# Patient Record
Sex: FEMALE | Race: WHITE | NOT HISPANIC OR LATINO | Employment: FULL TIME | URBAN - METROPOLITAN AREA
[De-identification: names, ages, dates, MRNs, and addresses within clinical notes are randomized per-mention and may not be internally consistent; named-entity substitution may affect disease eponyms.]

---

## 2017-12-21 ENCOUNTER — TRANSCRIBE ORDERS (OUTPATIENT)
Dept: ADMINISTRATIVE | Facility: HOSPITAL | Age: 32
End: 2017-12-21

## 2017-12-21 DIAGNOSIS — R07.89 CHEST WALL PAIN: ICD-10-CM

## 2017-12-21 DIAGNOSIS — N64.4 BREAST PAIN, LEFT: Primary | ICD-10-CM

## 2020-06-03 ENCOUNTER — HOSPITAL ENCOUNTER (EMERGENCY)
Facility: HOSPITAL | Age: 35
Discharge: HOME | End: 2020-06-03
Attending: EMERGENCY MEDICINE | Admitting: EMERGENCY MEDICINE

## 2020-06-03 ENCOUNTER — APPOINTMENT (OUTPATIENT)
Dept: ULTRASOUND IMAGING | Facility: HOSPITAL | Age: 35
End: 2020-06-03

## 2020-06-03 ENCOUNTER — APPOINTMENT (OUTPATIENT)
Dept: CT IMAGING | Facility: HOSPITAL | Age: 35
End: 2020-06-03

## 2020-06-03 VITALS
DIASTOLIC BLOOD PRESSURE: 89 MMHG | HEIGHT: 71 IN | OXYGEN SATURATION: 97 % | SYSTOLIC BLOOD PRESSURE: 128 MMHG | BODY MASS INDEX: 23.1 KG/M2 | RESPIRATION RATE: 16 BRPM | WEIGHT: 165 LBS | HEART RATE: 71 BPM | TEMPERATURE: 98.3 F

## 2020-06-03 DIAGNOSIS — R11.10 VOMITING AND DIARRHEA: ICD-10-CM

## 2020-06-03 DIAGNOSIS — R10.84 GENERALIZED ABDOMINAL PAIN: Primary | ICD-10-CM

## 2020-06-03 DIAGNOSIS — R19.7 VOMITING AND DIARRHEA: ICD-10-CM

## 2020-06-03 LAB
ALBUMIN SERPL-MCNC: 4.7 G/DL (ref 3.5–5.2)
ALBUMIN/GLOB SERPL: 1.8 G/DL
ALP SERPL-CCNC: 58 U/L (ref 39–117)
ALT SERPL W P-5'-P-CCNC: 27 U/L (ref 1–33)
ANION GAP SERPL CALCULATED.3IONS-SCNC: 15.1 MMOL/L (ref 5–15)
AST SERPL-CCNC: 48 U/L (ref 1–32)
B-HCG UR QL: NEGATIVE
BACTERIA UR QL AUTO: ABNORMAL /HPF
BASOPHILS # BLD AUTO: 0.06 10*3/MM3 (ref 0–0.2)
BASOPHILS NFR BLD AUTO: 0.7 % (ref 0–1.5)
BILIRUB SERPL-MCNC: 0.7 MG/DL (ref 0.2–1.2)
BILIRUB UR QL STRIP: ABNORMAL
BUN BLD-MCNC: 9 MG/DL (ref 6–20)
BUN/CREAT SERPL: 10.6 (ref 7–25)
CALCIUM SPEC-SCNC: 9.8 MG/DL (ref 8.6–10.5)
CHLORIDE SERPL-SCNC: 101 MMOL/L (ref 98–107)
CLARITY UR: CLEAR
CO2 SERPL-SCNC: 20.9 MMOL/L (ref 22–29)
COLOR UR: ABNORMAL
CREAT BLD-MCNC: 0.85 MG/DL (ref 0.57–1)
DEPRECATED RDW RBC AUTO: 53.8 FL (ref 37–54)
EOSINOPHIL # BLD AUTO: 0.15 10*3/MM3 (ref 0–0.4)
EOSINOPHIL NFR BLD AUTO: 1.6 % (ref 0.3–6.2)
ERYTHROCYTE [DISTWIDTH] IN BLOOD BY AUTOMATED COUNT: 14.1 % (ref 12.3–15.4)
GFR SERPL CREATININE-BSD FRML MDRD: 77 ML/MIN/1.73
GLOBULIN UR ELPH-MCNC: 2.6 GM/DL
GLUCOSE BLD-MCNC: 116 MG/DL (ref 65–99)
GLUCOSE UR STRIP-MCNC: NEGATIVE MG/DL
HCT VFR BLD AUTO: 40.5 % (ref 34–46.6)
HGB BLD-MCNC: 14.2 G/DL (ref 12–15.9)
HGB UR QL STRIP.AUTO: ABNORMAL
HYALINE CASTS UR QL AUTO: ABNORMAL /LPF
IMM GRANULOCYTES # BLD AUTO: 0.05 10*3/MM3 (ref 0–0.05)
IMM GRANULOCYTES NFR BLD AUTO: 0.5 % (ref 0–0.5)
KETONES UR QL STRIP: NEGATIVE
LEUKOCYTE ESTERASE UR QL STRIP.AUTO: NEGATIVE
LIPASE SERPL-CCNC: 43 U/L (ref 13–60)
LYMPHOCYTES # BLD AUTO: 1.56 10*3/MM3 (ref 0.7–3.1)
LYMPHOCYTES NFR BLD AUTO: 17 % (ref 19.6–45.3)
MACROCYTES BLD QL SMEAR: NORMAL
MCH RBC QN AUTO: 37.3 PG (ref 26.6–33)
MCHC RBC AUTO-ENTMCNC: 35.1 G/DL (ref 31.5–35.7)
MCV RBC AUTO: 106.3 FL (ref 79–97)
MONOCYTES # BLD AUTO: 0.39 10*3/MM3 (ref 0.1–0.9)
MONOCYTES NFR BLD AUTO: 4.2 % (ref 5–12)
MUCOUS THREADS URNS QL MICRO: ABNORMAL /HPF
NEUTROPHILS # BLD AUTO: 6.97 10*3/MM3 (ref 1.7–7)
NEUTROPHILS NFR BLD AUTO: 76 % (ref 42.7–76)
NITRITE UR QL STRIP: NEGATIVE
NRBC BLD AUTO-RTO: 0 /100 WBC (ref 0–0.2)
PH UR STRIP.AUTO: 5.5 [PH] (ref 4.5–8)
PLAT MORPH BLD: NORMAL
PLATELET # BLD AUTO: 191 10*3/MM3 (ref 140–450)
PMV BLD AUTO: 10.8 FL (ref 6–12)
POTASSIUM BLD-SCNC: 3.4 MMOL/L (ref 3.5–5.2)
PROT SERPL-MCNC: 7.3 G/DL (ref 6–8.5)
PROT UR QL STRIP: ABNORMAL
RBC # BLD AUTO: 3.81 10*6/MM3 (ref 3.77–5.28)
RBC # UR: ABNORMAL /HPF
REF LAB TEST METHOD: ABNORMAL
SODIUM BLD-SCNC: 137 MMOL/L (ref 136–145)
SP GR UR STRIP: 1.03 (ref 1–1.03)
SQUAMOUS #/AREA URNS HPF: ABNORMAL /HPF
UROBILINOGEN UR QL STRIP: ABNORMAL
WBC MORPH BLD: NORMAL
WBC NRBC COR # BLD: 9.18 10*3/MM3 (ref 3.4–10.8)
WBC UR QL AUTO: ABNORMAL /HPF

## 2020-06-03 PROCEDURE — 0 IOPAMIDOL PER 1 ML: Performed by: EMERGENCY MEDICINE

## 2020-06-03 PROCEDURE — 99283 EMERGENCY DEPT VISIT LOW MDM: CPT

## 2020-06-03 PROCEDURE — 85007 BL SMEAR W/DIFF WBC COUNT: CPT | Performed by: EMERGENCY MEDICINE

## 2020-06-03 PROCEDURE — 81025 URINE PREGNANCY TEST: CPT | Performed by: EMERGENCY MEDICINE

## 2020-06-03 PROCEDURE — 76705 ECHO EXAM OF ABDOMEN: CPT

## 2020-06-03 PROCEDURE — 83690 ASSAY OF LIPASE: CPT | Performed by: EMERGENCY MEDICINE

## 2020-06-03 PROCEDURE — 74177 CT ABD & PELVIS W/CONTRAST: CPT

## 2020-06-03 PROCEDURE — 85025 COMPLETE CBC W/AUTO DIFF WBC: CPT | Performed by: EMERGENCY MEDICINE

## 2020-06-03 PROCEDURE — 80053 COMPREHEN METABOLIC PANEL: CPT | Performed by: EMERGENCY MEDICINE

## 2020-06-03 PROCEDURE — U0002 COVID-19 LAB TEST NON-CDC: HCPCS | Performed by: EMERGENCY MEDICINE

## 2020-06-03 PROCEDURE — 99284 EMERGENCY DEPT VISIT MOD MDM: CPT | Performed by: EMERGENCY MEDICINE

## 2020-06-03 PROCEDURE — 81001 URINALYSIS AUTO W/SCOPE: CPT | Performed by: EMERGENCY MEDICINE

## 2020-06-03 RX ORDER — SODIUM CHLORIDE 0.9 % (FLUSH) 0.9 %
10 SYRINGE (ML) INJECTION AS NEEDED
Status: DISCONTINUED | OUTPATIENT
Start: 2020-06-03 | End: 2020-06-03 | Stop reason: HOSPADM

## 2020-06-03 RX ADMIN — IOPAMIDOL 50 ML: 755 INJECTION, SOLUTION INTRAVENOUS at 11:04

## 2020-06-03 NOTE — ED PROVIDER NOTES
Subjective   History of Present Illness  History of Present Illness    Chief complaint: Abdominal pain, vomiting or diarrhea    Location: Generalized abdomen but worse in the right upper quadrant area    Quality/Severity: Moderate aching, cramping pain    Timing/Duration: Ongoing for several weeks    Modifying Factors: Worse after eating, sometimes relieved when lying back flat    Narrative: This patient presents for evaluation of abdominal pain with vomiting and diarrhea symptoms for a couple of weeks now.  She has had frequent watery stools for a couple of weeks and intermittent nausea and vomiting.  The nausea vomiting is often worse after eating foods.  She says that foods of any kind can cause problems.  She is also had some pain in the right upper quadrant at times.  She went to an urgent care center this morning and they suggest that she should come here for further investigation especially of her gallbladder.  Patient is never had any abdominal surgeries.  She has not had any fevers.  She denies any dysuria or hematuria.    Associated Symptoms: As above    Review of Systems   Constitutional: Positive for appetite change. Negative for activity change and fever.   HENT: Negative.    Eyes: Negative for pain and visual disturbance.   Respiratory: Negative for cough and shortness of breath.    Cardiovascular: Negative for chest pain.   Gastrointestinal: Positive for abdominal pain, diarrhea, nausea and vomiting. Negative for constipation.   Genitourinary: Negative for dysuria, flank pain and frequency.   Skin: Negative for color change and rash.   Neurological: Negative for syncope and headaches.   All other systems reviewed and are negative.      History reviewed. No pertinent past medical history.    No Known Allergies    Past Surgical History:   Procedure Laterality Date   • INDUCED          History reviewed. No pertinent family history.    Social History     Socioeconomic History   • Marital  status:      Spouse name: Not on file   • Number of children: Not on file   • Years of education: Not on file   • Highest education level: Not on file   Tobacco Use   • Smoking status: Current Every Day Smoker   • Smokeless tobacco: Never Used   Substance and Sexual Activity   • Alcohol use: Yes     Alcohol/week: 15.0 standard drinks     Types: 15 Cans of beer per week   • Drug use: Defer   • Sexual activity: Defer     ED Triage Vitals [06/03/20 0921]   Temp Heart Rate Resp BP SpO2   97.6 °F (36.4 °C) 91 16 (!) 144/107 99 %      Temp src Heart Rate Source Patient Position BP Location FiO2 (%)   Tympanic Monitor Sitting Left arm --     Objective   Physical Exam   Constitutional: She is oriented to person, place, and time. She appears well-developed and well-nourished.  Non-toxic appearance. She does not appear ill. No distress.   HENT:   Head: Normocephalic and atraumatic.   Eyes: Pupils are equal, round, and reactive to light. EOM are normal. Right eye exhibits no discharge. Left eye exhibits no discharge.   Neck: Normal range of motion. Neck supple.   Cardiovascular: Normal rate, regular rhythm, normal heart sounds and intact distal pulses.   No murmur heard.  Pulmonary/Chest: Effort normal and breath sounds normal. No stridor. No respiratory distress. She has no wheezes. She has no rhonchi. She has no rales. She exhibits no tenderness.   Abdominal: Normal appearance and bowel sounds are normal. She exhibits no fluid wave. There is tenderness in the right upper quadrant and epigastric area. There is no rigidity, no rebound and no guarding. No hernia.   Musculoskeletal: Normal range of motion. She exhibits no edema or deformity.   Neurological: She is alert and oriented to person, place, and time.   Skin: Skin is warm and dry. No rash noted. No erythema. No pallor.   Psychiatric: She has a normal mood and affect. Her behavior is normal. Judgment and thought content normal.   Nursing note and vitals  reviewed.    Results for orders placed or performed during the hospital encounter of 06/03/20   Comprehensive Metabolic Panel   Result Value Ref Range    Glucose 116 (H) 65 - 99 mg/dL    BUN 9 6 - 20 mg/dL    Creatinine 0.85 0.57 - 1.00 mg/dL    Sodium 137 136 - 145 mmol/L    Potassium 3.4 (L) 3.5 - 5.2 mmol/L    Chloride 101 98 - 107 mmol/L    CO2 20.9 (L) 22.0 - 29.0 mmol/L    Calcium 9.8 8.6 - 10.5 mg/dL    Total Protein 7.3 6.0 - 8.5 g/dL    Albumin 4.70 3.50 - 5.20 g/dL    ALT (SGPT) 27 1 - 33 U/L    AST (SGOT) 48 (H) 1 - 32 U/L    Alkaline Phosphatase 58 39 - 117 U/L    Total Bilirubin 0.7 0.2 - 1.2 mg/dL    eGFR Non African Amer 77 >60 mL/min/1.73    Globulin 2.6 gm/dL    A/G Ratio 1.8 g/dL    BUN/Creatinine Ratio 10.6 7.0 - 25.0    Anion Gap 15.1 (H) 5.0 - 15.0 mmol/L   Lipase   Result Value Ref Range    Lipase 43 13 - 60 U/L   Pregnancy, Urine - Urine, Clean Catch   Result Value Ref Range    HCG, Urine QL Negative Negative   Urinalysis With Microscopic If Indicated (No Culture) - Urine, Clean Catch   Result Value Ref Range    Color, UA Dark Yellow (A) Yellow, Straw    Appearance, UA Clear Clear    pH, UA 5.5 4.5 - 8.0    Specific Gravity, UA 1.032 (H) 1.003 - 1.030    Glucose, UA Negative Negative    Ketones, UA Negative Negative    Bilirubin, UA Small (1+) (A) Negative    Blood, UA Small (1+) (A) Negative    Protein, UA 30 mg/dL (1+) (A) Negative    Leuk Esterase, UA Negative Negative    Nitrite, UA Negative Negative    Urobilinogen, UA 0.2 E.U./dL 0.2 - 1.0 E.U./dL   CBC Auto Differential   Result Value Ref Range    WBC 9.18 3.40 - 10.80 10*3/mm3    RBC 3.81 3.77 - 5.28 10*6/mm3    Hemoglobin 14.2 12.0 - 15.9 g/dL    Hematocrit 40.5 34.0 - 46.6 %    .3 (H) 79.0 - 97.0 fL    MCH 37.3 (H) 26.6 - 33.0 pg    MCHC 35.1 31.5 - 35.7 g/dL    RDW 14.1 12.3 - 15.4 %    RDW-SD 53.8 37.0 - 54.0 fl    MPV 10.8 6.0 - 12.0 fL    Platelets 191 140 - 450 10*3/mm3    Neutrophil % 76.0 42.7 - 76.0 %    Lymphocyte %  17.0 (L) 19.6 - 45.3 %    Monocyte % 4.2 (L) 5.0 - 12.0 %    Eosinophil % 1.6 0.3 - 6.2 %    Basophil % 0.7 0.0 - 1.5 %    Immature Grans % 0.5 0.0 - 0.5 %    Neutrophils, Absolute 6.97 1.70 - 7.00 10*3/mm3    Lymphocytes, Absolute 1.56 0.70 - 3.10 10*3/mm3    Monocytes, Absolute 0.39 0.10 - 0.90 10*3/mm3    Eosinophils, Absolute 0.15 0.00 - 0.40 10*3/mm3    Basophils, Absolute 0.06 0.00 - 0.20 10*3/mm3    Immature Grans, Absolute 0.05 0.00 - 0.05 10*3/mm3    nRBC 0.0 0.0 - 0.2 /100 WBC   Scan Slide   Result Value Ref Range    Macrocytes Slight/1+ None Seen    WBC Morphology Normal Normal    Platelet Morphology Normal Normal   Urinalysis, Microscopic Only - Urine, Clean Catch   Result Value Ref Range    RBC, UA 3-5 (A) None Seen /HPF    WBC, UA None Seen None Seen /HPF    Bacteria, UA None Seen None Seen /HPF    Squamous Epithelial Cells, UA 3-6 (A) None Seen, 0-2 /HPF    Hyaline Casts, UA None Seen None Seen /LPF    Mucus, UA Small/1+ (A) None Seen, Trace /HPF    Methodology Manual Light Microscopy      RADIOLOGY        Study: Ultrasound gallbladder    Findings: Increased echogenicity within the liver suggesting fatty change.  Otherwise normal    Interpreted contemporaneously with treatment by Dr. Pereira, independently viewed by me    RADIOLOGY        Study: CT abdomen pelvis with contrast    Findings: 17 mm right ovarian cyst which should be physiologic in a  patient this age.    Otherwise normal study      Interpreted contemporaneously with treatment by Dr. Pereira, independently viewed by me    Procedures           ED Course  ED Course as of Jun 03 1212   Wed Jun 03, 2020   1211 I have reviewed the labs and radiology reports from today's visit.  Overall there are no worrisome findings here.  Patient's abdominal exam is not worrisome as there are no peritoneal features at all.  I suggested that she should follow-up in the gastroenterology clinic for further outpatient testing.  I explained that it may be beneficial to  "have endoscopies or other modalities looked into for her symptoms.  I gave her the phone number for Dr. Akbar's office and recommended she call them today.  I reviewed with her the usual \"return to ER\" instructions for worsening signs or symptoms.  She was discharged home in good condition after that.    [CHRIST]      ED Course User Index  [CHRIST] Toni Keith MD                                           MDM  Number of Diagnoses or Management Options  Generalized abdominal pain:   Vomiting and diarrhea:      Amount and/or Complexity of Data Reviewed  Clinical lab tests: reviewed and ordered  Tests in the radiology section of CPT®: reviewed and ordered  Decide to obtain previous medical records or to obtain history from someone other than the patient: yes  Review and summarize past medical records: yes  Independent visualization of images, tracings, or specimens: yes    Risk of Complications, Morbidity, and/or Mortality  Presenting problems: moderate  Diagnostic procedures: moderate  Management options: moderate        Final diagnoses:   Generalized abdominal pain   Vomiting and diarrhea            Toni Keith MD  06/03/20 1212    "

## 2020-06-03 NOTE — ED TRIAGE NOTES
Pt ambulatory with steady  Gait to room 5. Reports RUQ abdominal pain x days with associated nausea and diarrhea. Pt reports she went to [facility] urgent care this morning and was told to come to ED to have gallbladder US. Tenderness located to RUQ upon palpation. No radiation reported. Pt states sx worsened after eating and relieved with heat pack and laying flat.

## 2020-06-03 NOTE — DISCHARGE INSTRUCTIONS
Recommend outpatient follow-up with Dr. Akbar from the GI specialty office.  Please return to the emergency room for any worsening pain, fevers, vomiting, diarrhea, weakness, bleeding, difficulties urinating or any other concerns.

## 2020-06-11 ENCOUNTER — OFFICE VISIT (OUTPATIENT)
Dept: GASTROENTEROLOGY | Facility: CLINIC | Age: 35
End: 2020-06-11

## 2020-06-11 ENCOUNTER — LAB (OUTPATIENT)
Dept: LAB | Facility: HOSPITAL | Age: 35
End: 2020-06-11

## 2020-06-11 VITALS — TEMPERATURE: 98.2 F | WEIGHT: 171 LBS | BODY MASS INDEX: 23.94 KG/M2 | HEIGHT: 71 IN

## 2020-06-11 DIAGNOSIS — Z83.71 FAMILY HISTORY OF POLYPS IN THE COLON: ICD-10-CM

## 2020-06-11 DIAGNOSIS — K21.9 GASTROESOPHAGEAL REFLUX DISEASE, ESOPHAGITIS PRESENCE NOT SPECIFIED: ICD-10-CM

## 2020-06-11 DIAGNOSIS — R19.7 DIARRHEA OF PRESUMED INFECTIOUS ORIGIN: ICD-10-CM

## 2020-06-11 DIAGNOSIS — R19.7 DIARRHEA OF PRESUMED INFECTIOUS ORIGIN: Primary | ICD-10-CM

## 2020-06-11 PROCEDURE — 99203 OFFICE O/P NEW LOW 30 MIN: CPT | Performed by: INTERNAL MEDICINE

## 2020-06-11 PROCEDURE — 36415 COLL VENOUS BLD VENIPUNCTURE: CPT

## 2020-06-11 PROCEDURE — 86677 HELICOBACTER PYLORI ANTIBODY: CPT

## 2020-06-11 RX ORDER — PANTOPRAZOLE SODIUM 40 MG/1
40 TABLET, DELAYED RELEASE ORAL DAILY
Qty: 90 TABLET | Refills: 3 | Status: SHIPPED | OUTPATIENT
Start: 2020-06-11 | End: 2020-08-04

## 2020-06-11 NOTE — PROGRESS NOTES
"    PATIENT INFORMATION  Elin Quiñonez       - 1985    CHIEF COMPLAINT  Chief Complaint   Patient presents with   • Diarrhea   • Abdominal Pain       HISTORY OF PRESENT ILLNESS  Here from ER f/u for abd pain and diarrhea    Normal 1-2 a day and 4 weeks of nocturnal and daily BMs of bilious watery and 8-10 a day    Post prandial cramping and nausea and only 2 episodes of vomiting    Was treated for Diverticulitis but review of [facility] records was c/w Epiploic appedagitis    Does have significant belching and mild dysphagia but no meat impactions and does get sour water brash. Has use TUMs and Pepcid  Does take a Prenatal vitamen daily    Does take ibuprofen weekly for years          REVIEW OF SYSTEMS  Review of Systems   Constitutional: Positive for appetite change.   Gastrointestinal: Positive for abdominal pain and diarrhea.         ACTIVE PROBLEMS  There are no active problems to display for this patient.        PAST MEDICAL HISTORY  History reviewed. No pertinent past medical history.      SURGICAL HISTORY  Past Surgical History:   Procedure Laterality Date   • INDUCED            FAMILY HISTORY  Family History   Problem Relation Age of Onset   • Colon polyps Mother    • Colon cancer Neg Hx          SOCIAL HISTORY  Social History     Occupational History   • Not on file   Tobacco Use   • Smoking status: Current Every Day Smoker   • Smokeless tobacco: Never Used   Substance and Sexual Activity   • Alcohol use: Yes     Alcohol/week: 15.0 standard drinks     Types: 15 Cans of beer per week   • Drug use: Defer   • Sexual activity: Defer         CURRENT MEDICATIONS    Current Outpatient Medications:   •  pantoprazole (PROTONIX) 40 MG EC tablet, Take 1 tablet by mouth Daily., Disp: 90 tablet, Rfl: 3    ALLERGIES  Patient has no known allergies.    VITALS  Vitals:    20 1348   Temp: 98.2 °F (36.8 °C)   TempSrc: Temporal   Weight: 77.6 kg (171 lb)   Height: 180.3 cm (70.98\")       LAST " RESULTS   Admission on 06/03/2020, Discharged on 06/03/2020   Component Date Value Ref Range Status   • Glucose 06/03/2020 116* 65 - 99 mg/dL Final   • BUN 06/03/2020 9  6 - 20 mg/dL Final   • Creatinine 06/03/2020 0.85  0.57 - 1.00 mg/dL Final   • Sodium 06/03/2020 137  136 - 145 mmol/L Final   • Potassium 06/03/2020 3.4* 3.5 - 5.2 mmol/L Final   • Chloride 06/03/2020 101  98 - 107 mmol/L Final   • CO2 06/03/2020 20.9* 22.0 - 29.0 mmol/L Final   • Calcium 06/03/2020 9.8  8.6 - 10.5 mg/dL Final   • Total Protein 06/03/2020 7.3  6.0 - 8.5 g/dL Final   • Albumin 06/03/2020 4.70  3.50 - 5.20 g/dL Final   • ALT (SGPT) 06/03/2020 27  1 - 33 U/L Final   • AST (SGOT) 06/03/2020 48* 1 - 32 U/L Final   • Alkaline Phosphatase 06/03/2020 58  39 - 117 U/L Final   • Total Bilirubin 06/03/2020 0.7  0.2 - 1.2 mg/dL Final   • eGFR Non African Amer 06/03/2020 77  >60 mL/min/1.73 Final   • Globulin 06/03/2020 2.6  gm/dL Final   • A/G Ratio 06/03/2020 1.8  g/dL Final   • BUN/Creatinine Ratio 06/03/2020 10.6  7.0 - 25.0 Final   • Anion Gap 06/03/2020 15.1* 5.0 - 15.0 mmol/L Final   • Lipase 06/03/2020 43  13 - 60 U/L Final   • HCG, Urine QL 06/03/2020 Negative  Negative Final   • Color, UA 06/03/2020 Dark Yellow* Yellow, Straw Final   • Appearance, UA 06/03/2020 Clear  Clear Final   • pH, UA 06/03/2020 5.5  4.5 - 8.0 Final   • Specific Gravity, UA 06/03/2020 1.032* 1.003 - 1.030 Final    Result obtained by Refractometer   • Glucose, UA 06/03/2020 Negative  Negative Final   • Ketones, UA 06/03/2020 Negative  Negative Final   • Bilirubin, UA 06/03/2020 Small (1+)* Negative Final   • Blood, UA 06/03/2020 Small (1+)* Negative Final   • Protein, UA 06/03/2020 30 mg/dL (1+)* Negative Final   • Leuk Esterase, UA 06/03/2020 Negative  Negative Final   • Nitrite, UA 06/03/2020 Negative  Negative Final   • Urobilinogen, UA 06/03/2020 0.2 E.U./dL  0.2 - 1.0 E.U./dL Final   • WBC 06/03/2020 9.18  3.40 - 10.80 10*3/mm3 Final   • RBC 06/03/2020 3.81   3.77 - 5.28 10*6/mm3 Final   • Hemoglobin 06/03/2020 14.2  12.0 - 15.9 g/dL Final   • Hematocrit 06/03/2020 40.5  34.0 - 46.6 % Final   • MCV 06/03/2020 106.3* 79.0 - 97.0 fL Final   • MCH 06/03/2020 37.3* 26.6 - 33.0 pg Final   • MCHC 06/03/2020 35.1  31.5 - 35.7 g/dL Final   • RDW 06/03/2020 14.1  12.3 - 15.4 % Final   • RDW-SD 06/03/2020 53.8  37.0 - 54.0 fl Final   • MPV 06/03/2020 10.8  6.0 - 12.0 fL Final   • Platelets 06/03/2020 191  140 - 450 10*3/mm3 Final   • Neutrophil % 06/03/2020 76.0  42.7 - 76.0 % Final   • Lymphocyte % 06/03/2020 17.0* 19.6 - 45.3 % Final   • Monocyte % 06/03/2020 4.2* 5.0 - 12.0 % Final   • Eosinophil % 06/03/2020 1.6  0.3 - 6.2 % Final   • Basophil % 06/03/2020 0.7  0.0 - 1.5 % Final   • Immature Grans % 06/03/2020 0.5  0.0 - 0.5 % Final   • Neutrophils, Absolute 06/03/2020 6.97  1.70 - 7.00 10*3/mm3 Final   • Lymphocytes, Absolute 06/03/2020 1.56  0.70 - 3.10 10*3/mm3 Final   • Monocytes, Absolute 06/03/2020 0.39  0.10 - 0.90 10*3/mm3 Final   • Eosinophils, Absolute 06/03/2020 0.15  0.00 - 0.40 10*3/mm3 Final   • Basophils, Absolute 06/03/2020 0.06  0.00 - 0.20 10*3/mm3 Final   • Immature Grans, Absolute 06/03/2020 0.05  0.00 - 0.05 10*3/mm3 Final   • nRBC 06/03/2020 0.0  0.0 - 0.2 /100 WBC Final   • Macrocytes 06/03/2020 Slight/1+  None Seen Final   • WBC Morphology 06/03/2020 Normal  Normal Final   • Platelet Morphology 06/03/2020 Normal  Normal Final   • RBC, UA 06/03/2020 3-5* None Seen /HPF Final   • WBC, UA 06/03/2020 None Seen  None Seen /HPF Final   • Bacteria, UA 06/03/2020 None Seen  None Seen /HPF Final   • Squamous Epithelial Cells, UA 06/03/2020 3-6* None Seen, 0-2 /HPF Final   • Hyaline Casts, UA 06/03/2020 None Seen  None Seen /LPF Final   • Mucus, UA 06/03/2020 Small/1+* None Seen, Trace /HPF Final   • Methodology 06/03/2020 Manual Light Microscopy   Final     Ct Abdomen Pelvis With Contrast    Result Date: 6/3/2020  Narrative: CT abdomen and pelvis with contrast    06/03/2020  HISTORY: Right-sided abdominal pain for 2 weeks  COMPARISON: NONE  TECHNIQUE:  CT of Abdomen and Pelvis with contrast performed.  Sagittal and Coronal reconstructions performed. Radiation dose reduction techniques included automated exposure control or exposure modulation based on body size. Radiation audit for CT and nuclear cardiology exams in the last 12 months: 0.  FINDINGS:  Abdomen: Lung bases are clear. Liver, gallbladder, spleen, pancreas, adrenal glands and kidneys are normal in appearance. Aorta is normal in size. There is no adenopathy. Bowel is normal. Terminal ileum cecum appear normal. The appendix is normal.   Pelvis: Bladder uterus and left ovary appear normal. The right ovary has a small cyst measuring 17 mm in diameter. Bones are unremarkable.       Impression: 17 mm right ovarian cyst which should be physiologic in a patient this age.  Otherwise normal study  This report was finalized on 6/3/2020 11:29 AM by Dr. Madhav Pereira MD.      Us Gallbladder    Result Date: 6/3/2020  Narrative: ULTRASOUND ABDOMEN, LIMITED, 06/03/2020  HISTORY: Intermittent nausea vomiting diarrhea for 2 weeks with right upper quadrant pain  TECHNIQUE: Grayscale ultrasound imaging of the right upper quadrant was performed.  FINDINGS: The visualized portions of the pancreas are normal. Liver slightly increased in echogenicity suggesting fatty change The gallbladder is normal in ultrasound appearance. There is no visible cholelithiasis, gallbladder wall thickening or adjacent fluid collection. There is no intrahepatic or extra hepatic bile duct dilatation (CBD 3 mm). Right kidney is negative with no hydronephrosis.      Impression: Increased echogenicity within the liver suggesting fatty change. Otherwise normal  This report was finalized on 6/3/2020 10:31 AM by Dr. Madhav Pereira MD.        PHYSICAL EXAM  Debilities/Disabilities Identified: None  Emotional Behavior: Appropriate  Physical Exam   Constitutional: She is  oriented to person, place, and time. She appears well-developed and well-nourished.   HENT:   Head: Normocephalic and atraumatic.   Eyes: Pupils are equal, round, and reactive to light. Conjunctivae and EOM are normal. No scleral icterus.   Neck: Normal range of motion. Neck supple. No thyromegaly present.   Cardiovascular: Normal rate, regular rhythm, normal heart sounds and intact distal pulses. Exam reveals no gallop.   No murmur heard.  Pulmonary/Chest: Effort normal and breath sounds normal. She has no wheezes. She has no rales.   Abdominal: Soft. Bowel sounds are normal. She exhibits no shifting dullness, no distension, no fluid wave, no abdominal bruit, no ascites and no mass. There is no hepatosplenomegaly. There is tenderness in the right upper quadrant, epigastric area and left upper quadrant. There is no guarding and negative Encarnacion's sign. Hernia confirmed negative in the ventral area.   Musculoskeletal: Normal range of motion. She exhibits no edema.   Lymphadenopathy:     She has no cervical adenopathy.   Neurological: She is alert and oriented to person, place, and time.   Skin: Skin is warm and dry. No rash noted. She is not diaphoretic. No erythema.   Psychiatric: She has a normal mood and affect. Her behavior is normal.       CLINICAL DATA REVIEWED   reviewed previous lab results and integrated with today's visit, reviewed notes from other physicians and/or last GI encounter, reviewed previous endoscopy results and available photos    ASSESSMENT  Diagnoses and all orders for this visit:    Diarrhea of presumed infectious origin  -     Helicobacter Pylori, IgA IgG IgM; Future  -     Clostridium Difficile Toxin, PCR - Stool, Per Rectum; Future  -     Gastrointestinal Panel, PCR - Stool, Per Rectum; Future    Family history of polyps in the colon    Gastroesophageal reflux disease, esophagitis presence not specified    Other orders  -     pantoprazole (PROTONIX) 40 MG EC tablet; Take 1 tablet by mouth  Daily.          PLAN  Check SS, HP and start a PPI will proceed with results as they come back  Return in about 2 months (around 8/11/2020), or if symptoms worsen or fail to improve.    I have discussed the above plan with the patient.  They verbalize understanding and are in agreement with the plan.  They have been advised to contact the office for any questions, concerns, or changes related to their health.

## 2020-06-12 ENCOUNTER — LAB (OUTPATIENT)
Dept: LAB | Facility: HOSPITAL | Age: 35
End: 2020-06-12

## 2020-06-12 DIAGNOSIS — R19.7 DIARRHEA OF PRESUMED INFECTIOUS ORIGIN: ICD-10-CM

## 2020-06-12 LAB
ADV 40+41 DNA STL QL NAA+NON-PROBE: NOT DETECTED
ASTRO TYP 1-8 RNA STL QL NAA+NON-PROBE: NOT DETECTED
C CAYETANENSIS DNA STL QL NAA+NON-PROBE: NOT DETECTED
C DIFF GDH STL QL: NEGATIVE
CAMPY SP DNA.DIARRHEA STL QL NAA+PROBE: NOT DETECTED
CRYPTOSP STL CULT: NOT DETECTED
E COLI DNA SPEC QL NAA+PROBE: NOT DETECTED
E HISTOLYT AG STL-ACNC: NOT DETECTED
EAEC PAA PLAS AGGR+AATA ST NAA+NON-PRB: NOT DETECTED
EC STX1 + STX2 GENES STL NAA+PROBE: NOT DETECTED
EPEC EAE GENE STL QL NAA+NON-PROBE: NOT DETECTED
ETEC LTA+ST1A+ST1B TOX ST NAA+NON-PROBE: NOT DETECTED
G LAMBLIA DNA SPEC QL NAA+PROBE: NOT DETECTED
H PYLORI IGA SER IA-ACNC: <9 UNITS (ref 0–8.9)
H PYLORI IGG SER IA-ACNC: 0.57 INDEX VALUE (ref 0–0.79)
H PYLORI IGM SER-ACNC: <9 UNITS (ref 0–8.9)
NOROVIRUS GI+II RNA STL QL NAA+NON-PROBE: NOT DETECTED
P SHIGELLOIDES DNA STL QL NAA+PROBE: NOT DETECTED
RV RNA STL NAA+PROBE: NOT DETECTED
SALMONELLA DNA SPEC QL NAA+PROBE: NOT DETECTED
SAPO I+II+IV+V RNA STL QL NAA+NON-PROBE: NOT DETECTED
SHIGELLA SP+EIEC IPAH STL QL NAA+PROBE: NOT DETECTED
V CHOLERAE DNA SPEC QL NAA+PROBE: NOT DETECTED
VIBRIO DNA SPEC NAA+PROBE: NOT DETECTED
YERSINIA STL CULT: NOT DETECTED

## 2020-06-12 PROCEDURE — 87449 NOS EACH ORGANISM AG IA: CPT

## 2020-06-12 PROCEDURE — 87324 CLOSTRIDIUM AG IA: CPT

## 2020-06-12 PROCEDURE — 0097U HC BIOFIRE FILMARRAY GI PANEL: CPT

## 2020-07-17 ENCOUNTER — TELEPHONE (OUTPATIENT)
Dept: GASTROENTEROLOGY | Facility: CLINIC | Age: 35
End: 2020-07-17

## 2020-07-20 ENCOUNTER — PREP FOR SURGERY (OUTPATIENT)
Dept: OTHER | Facility: HOSPITAL | Age: 35
End: 2020-07-20

## 2020-07-20 DIAGNOSIS — R19.7 VOMITING AND DIARRHEA: Primary | ICD-10-CM

## 2020-07-20 DIAGNOSIS — R11.10 VOMITING AND DIARRHEA: Primary | ICD-10-CM

## 2020-07-20 DIAGNOSIS — K21.9 GASTROESOPHAGEAL REFLUX DISEASE, ESOPHAGITIS PRESENCE NOT SPECIFIED: Primary | ICD-10-CM

## 2020-07-20 DIAGNOSIS — K21.9 GERD WITHOUT ESOPHAGITIS: ICD-10-CM

## 2020-07-20 DIAGNOSIS — R19.7 DIARRHEA: ICD-10-CM

## 2020-07-20 NOTE — TELEPHONE ENCOUNTER
CALLED AND SPOKE WITH PATIENT.  EXPLAINED NEED TO SCHEDULE EGD & COLONOSCOPY.  SCHEDULED AT [place] 08/07/2020 AT 1:30PM - ARRIVE 12:30PM.  WILL MAIL INSTRUCTIONS.

## 2020-07-20 NOTE — TELEPHONE ENCOUNTER
Dr. Akbar recommended EGD/colonoscopy if not improving, so I will go ahead and order these for her.  Since her  GI studies labs were all negative and protonix 40mg po  Daily is not improving symptoms of GI upset and diarrhea.

## 2020-07-29 ENCOUNTER — TELEPHONE (OUTPATIENT)
Dept: GASTROENTEROLOGY | Facility: CLINIC | Age: 35
End: 2020-07-29

## 2020-07-29 NOTE — TELEPHONE ENCOUNTER
RECEIVED EMAILED:    Do you want me to cancel or reschedule?    From: Greta Patel ([facility]) <[email or web address]   Sent: 2020 2:19 PM  To: Patria Issa ([facility]) <[email or web address]>; Koby Ordonez ([facility]) <[email or web address]>  Subject: cancel    Hi, I spoke with Elin littlejohn 1985 id#[id number] scheduled for egd & colonoscopy on 2020. She is rescheduling due to quarantine for poss CORONA virus, she will call ’s office. ANITRA    Thank you J have a great night!!!!  Greta MILES

## 2020-08-04 ENCOUNTER — OFFICE VISIT (OUTPATIENT)
Dept: INTERNAL MEDICINE | Facility: CLINIC | Age: 35
End: 2020-08-04

## 2020-08-04 VITALS
HEIGHT: 71 IN | RESPIRATION RATE: 14 BRPM | BODY MASS INDEX: 22.2 KG/M2 | WEIGHT: 158.6 LBS | HEART RATE: 93 BPM | TEMPERATURE: 97.3 F | SYSTOLIC BLOOD PRESSURE: 110 MMHG | OXYGEN SATURATION: 98 % | DIASTOLIC BLOOD PRESSURE: 88 MMHG

## 2020-08-04 DIAGNOSIS — Z72.0 TOBACCO ABUSE DISORDER: Primary | ICD-10-CM

## 2020-08-04 DIAGNOSIS — Z00.00 ROUTINE HEALTH MAINTENANCE: ICD-10-CM

## 2020-08-04 DIAGNOSIS — R19.7 DIARRHEA, UNSPECIFIED TYPE: ICD-10-CM

## 2020-08-04 DIAGNOSIS — K21.9 GASTROESOPHAGEAL REFLUX DISEASE, ESOPHAGITIS PRESENCE NOT SPECIFIED: ICD-10-CM

## 2020-08-04 DIAGNOSIS — Z23 NEED FOR TDAP VACCINATION: ICD-10-CM

## 2020-08-04 PROCEDURE — 90471 IMMUNIZATION ADMIN: CPT | Performed by: NURSE PRACTITIONER

## 2020-08-04 PROCEDURE — 99214 OFFICE O/P EST MOD 30 MIN: CPT | Performed by: NURSE PRACTITIONER

## 2020-08-04 PROCEDURE — 90715 TDAP VACCINE 7 YRS/> IM: CPT | Performed by: NURSE PRACTITIONER

## 2020-08-04 NOTE — PROGRESS NOTES
Subjective    Elin Quiñonez is a 35 y.o. female presenting today for   Chief Complaint   Patient presents with   • Establish Care   • GI Problem   • Diarrhea   • Nicotine Dependence       History of Present Illness     Elin Quiñonez presents today as a new patient to me to establish care.   Prior PCP was no one for many years.  Patient Care Team:  Laura Barclay APRN as PCP - General (Family Medicine)  Naomie, Karan Gregory MD as Consulting Physician (Gastroenterology)    Current/chronic health conditions include:    There is no problem list on file for this patient.      No current outpatient medications on file.        New complaints today include:    Pt reports stress/anxiety and depression r/t pandemic. She gained 20# initially during quarantine and then has now lost that 20# through diet and exercise.    Pt has been having some issues w/ reflux as well as diarrhea. These had been going on for months. She has been evaluated by GI - Dr. Akbar. She is scheduled for EGD and colonoscopy. These are improved since she is smoking less and has cut out gluten.    Pt is a 1.5 PPD smoker since age 19. She has been ill w/ URI over the last two weeks. She has had two negative Covid tests. She is currently on abx therapy prescribed by [facility]. She is working on smoking cessation.        Social:  Pt is a  worker.        The following portions of the patient's history were reviewed and updated as appropriate: allergies, current medications, problem list, past medical history, past surgical history, family history, and social history.     Review of Systems   Constitutional: Negative.    HENT: Positive for ear pain.    Eyes: Negative.    Respiratory: Positive for cough. Negative for chest tightness, shortness of breath and wheezing.    Cardiovascular: Negative.    Gastrointestinal: Positive for diarrhea and GERD.   Endocrine: Negative.    Genitourinary: Negative.    Musculoskeletal: Negative.    Skin:  "Negative.    Neurological: Negative.    Hematological: Negative.    Psychiatric/Behavioral: Negative.        Objective    Vitals:    08/04/20 1014   BP: 110/88   BP Location: Left arm   Patient Position: Sitting   Cuff Size: Adult   Pulse: 93   Resp: 14   Temp: 97.3 °F (36.3 °C)   TempSrc: Temporal   SpO2: 98%   Weight: 71.9 kg (158 lb 9.6 oz)   Height: 180.3 cm (70.98\")     Body mass index is 22.13 kg/m².  Nursing notes and vitals reviewed.    Physical Exam   Constitutional: She is oriented to person, place, and time. She appears well-developed and well-nourished.   HENT:   Head: Normocephalic.   Right Ear: Hearing, tympanic membrane, external ear and ear canal normal.   Left Ear: Hearing, tympanic membrane, external ear and ear canal normal.   Nose: Nose normal. No mucosal edema or rhinorrhea.   Mouth/Throat: Uvula is midline, oropharynx is clear and moist and mucous membranes are normal. No tonsillar exudate.   Eyes: Pupils are equal, round, and reactive to light. Conjunctivae, EOM and lids are normal.   Neck: Normal range of motion. Neck supple. No thyroid mass and no thyromegaly present.   Cardiovascular: Regular rhythm, S1 normal, S2 normal and normal pulses. Exam reveals no gallop and no friction rub.   No murmur heard.  Pulmonary/Chest: Effort normal and breath sounds normal. She has no wheezes. She has no rhonchi. She has no rales.   Abdominal: Soft. Normal appearance and bowel sounds are normal. There is no hepatosplenomegaly. There is no tenderness. There is no guarding. No hernia.   Musculoskeletal: Normal range of motion. She exhibits no edema or deformity.   Lymphadenopathy:     She has no cervical adenopathy.   Neurological: She is alert and oriented to person, place, and time. She has normal strength and normal reflexes. No cranial nerve deficit or sensory deficit.   Skin: Skin is warm, dry and intact. No lesion and no rash noted.   Psychiatric: She has a normal mood and affect. Her speech is normal " and behavior is normal. Thought content normal. She is attentive.       No results found for this or any previous visit (from the past 672 hour(s)).      Assessment and Plan    Elin was seen today for establish care, gi problem, diarrhea and nicotine dependence.    Diagnoses and all orders for this visit:    Tobacco abuse disorder    Gastroesophageal reflux disease, esophagitis presence not specified    Diarrhea, unspecified type    Routine health maintenance  -     CBC & Differential  -     Comprehensive Metabolic Panel  -     Hepatitis C Antibody  -     Lipid Panel With / Chol / HDL Ratio  -     Thyroid Cascade Profile  -     Vitamin D 25 Hydroxy    Need for Tdap vaccination  -     Tdap Vaccine Greater Than or Equal To 8yo IM        Fasting. Labs today.  Counseled re smoking cessation.  Provided contact info for OB/GYN for well-woman care.        The plan of care was discussed. All questions were answered. Patient verbalized understanding.        Return in about 1 week (around 8/11/2020) for Annual physical.

## 2020-08-05 LAB
25(OH)D3+25(OH)D2 SERPL-MCNC: 57.4 NG/ML (ref 30–100)
ALBUMIN SERPL-MCNC: 5 G/DL (ref 3.5–5.2)
ALBUMIN/GLOB SERPL: 2.8 G/DL
ALP SERPL-CCNC: 46 U/L (ref 39–117)
ALT SERPL-CCNC: 77 U/L (ref 1–33)
AST SERPL-CCNC: 72 U/L (ref 1–32)
BASOPHILS # BLD MANUAL: 0.12 10*3/MM3 (ref 0–0.2)
BASOPHILS NFR BLD MANUAL: 2.1 % (ref 0–1.5)
BILIRUB SERPL-MCNC: 0.4 MG/DL (ref 0–1.2)
BUN SERPL-MCNC: 11 MG/DL (ref 6–20)
BUN/CREAT SERPL: 16.2 (ref 7–25)
CALCIUM SERPL-MCNC: 9.6 MG/DL (ref 8.6–10.5)
CHLORIDE SERPL-SCNC: 99 MMOL/L (ref 98–107)
CHOLEST SERPL-MCNC: 185 MG/DL (ref 0–200)
CHOLEST/HDLC SERPL: 2.85 {RATIO}
CO2 SERPL-SCNC: 20.9 MMOL/L (ref 22–29)
CREAT SERPL-MCNC: 0.68 MG/DL (ref 0.57–1)
DIFFERENTIAL COMMENT: ABNORMAL
EOSINOPHIL # BLD MANUAL: 0.06 10*3/MM3 (ref 0–0.4)
EOSINOPHIL NFR BLD MANUAL: 1 % (ref 0.3–6.2)
ERYTHROCYTE [DISTWIDTH] IN BLOOD BY AUTOMATED COUNT: 12.5 % (ref 12.3–15.4)
GLOBULIN SER CALC-MCNC: 1.8 GM/DL
GLUCOSE SERPL-MCNC: 73 MG/DL (ref 65–99)
HCT VFR BLD AUTO: 43.6 % (ref 34–46.6)
HCV AB S/CO SERPL IA: <0.1 S/CO RATIO (ref 0–0.9)
HDLC SERPL-MCNC: 65 MG/DL (ref 40–60)
HGB BLD-MCNC: 15 G/DL (ref 12–15.9)
LDLC SERPL CALC-MCNC: 93 MG/DL (ref 0–100)
LYMPHOCYTES # BLD MANUAL: 1.7 10*3/MM3 (ref 0.7–3.1)
LYMPHOCYTES NFR BLD MANUAL: 29.9 % (ref 19.6–45.3)
MCH RBC QN AUTO: 37 PG (ref 26.6–33)
MCHC RBC AUTO-ENTMCNC: 34.4 G/DL (ref 31.5–35.7)
MCV RBC AUTO: 107.7 FL (ref 79–97)
MONOCYTES # BLD MANUAL: 0.06 10*3/MM3 (ref 0.1–0.9)
MONOCYTES NFR BLD MANUAL: 1 % (ref 5–12)
NEUTROPHILS # BLD MANUAL: 3.76 10*3/MM3 (ref 1.7–7)
NEUTROPHILS NFR BLD MANUAL: 66 % (ref 42.7–76)
NRBC BLD AUTO-RTO: 0 /100 WBC (ref 0–0.2)
PLATELET # BLD AUTO: 145 10*3/MM3 (ref 140–450)
PLATELET BLD QL SMEAR: ABNORMAL
POTASSIUM SERPL-SCNC: 4.4 MMOL/L (ref 3.5–5.2)
PROT SERPL-MCNC: 6.8 G/DL (ref 6–8.5)
RBC # BLD AUTO: 4.05 10*6/MM3 (ref 3.77–5.28)
RBC MORPH BLD: ABNORMAL
SODIUM SERPL-SCNC: 139 MMOL/L (ref 136–145)
TRIGL SERPL-MCNC: 134 MG/DL (ref 0–150)
TSH SERPL DL<=0.005 MIU/L-ACNC: 1.77 UIU/ML (ref 0.45–4.5)
VLDLC SERPL CALC-MCNC: 26.8 MG/DL
WBC # BLD AUTO: 5.69 10*3/MM3 (ref 3.4–10.8)

## 2020-08-14 ENCOUNTER — OFFICE VISIT (OUTPATIENT)
Dept: INTERNAL MEDICINE | Facility: CLINIC | Age: 35
End: 2020-08-14

## 2020-08-14 VITALS
OXYGEN SATURATION: 98 % | WEIGHT: 157 LBS | HEART RATE: 77 BPM | SYSTOLIC BLOOD PRESSURE: 106 MMHG | HEIGHT: 71 IN | BODY MASS INDEX: 21.98 KG/M2 | RESPIRATION RATE: 16 BRPM | DIASTOLIC BLOOD PRESSURE: 80 MMHG | TEMPERATURE: 96 F

## 2020-08-14 DIAGNOSIS — Z00.00 ENCOUNTER FOR WELLNESS EXAMINATION IN ADULT: Primary | ICD-10-CM

## 2020-08-14 DIAGNOSIS — Z91.09 ENVIRONMENTAL ALLERGIES: ICD-10-CM

## 2020-08-14 DIAGNOSIS — Z72.0 TOBACCO ABUSE DISORDER: ICD-10-CM

## 2020-08-14 DIAGNOSIS — R74.8 ELEVATED LIVER ENZYMES: ICD-10-CM

## 2020-08-14 PROCEDURE — 99395 PREV VISIT EST AGE 18-39: CPT | Performed by: NURSE PRACTITIONER

## 2020-08-14 RX ORDER — CETIRIZINE HYDROCHLORIDE 10 MG/1
10 TABLET ORAL DAILY PRN
COMMUNITY
End: 2022-03-30

## 2020-08-14 NOTE — PROGRESS NOTES
MITCH Worthington is a 35 y.o. female presenting for Annual Exam    Her current/chronic health conditions include:  There is no problem list on file for this patient.    Current Outpatient Medications on File Prior to Visit   Medication Sig Dispense Refill   • cetirizine (zyrTEC) 10 MG tablet Take 10 mg by mouth Daily As Needed.     • Multiple Vitamin (MULTIVITAMIN PO) Take  by mouth.     • VITAMIN D PO Take  by mouth.     • ZINC OXIDE PO Take  by mouth.     • [DISCONTINUED] Multiple Vitamins-Minerals (ZINC PO) Take  by mouth.       No current facility-administered medications on file prior to visit.      Pt has completed previous abx. Sts sinusitis S&S have cleared but continues to note tickle in back of throat. Itchy eyes. Ear pressure. She is trying to remember to take Zyrtec each day and this does help when she remembers.        Health Habits:  Dental Exam. she has appt in 2 weeks  Eye Exam. up to date  Exercise: 7 times/week.  Current exercise activities include: walking - she would like to run a 5K by the end of October    ETOH use is daily, generally at least two drinks but more on the weekends    Screenings:  Last pap date: scheduled in three weeks  Mammogram: n/a  Dexa: n/a  Colonoscopy: scheduled for EGD and c-scope w/ Naomie in October  Tob use: She is four days and ten hours smoke free. She is using patches.  Qualifies for lung Ca screening? n/a    Complaints today include: none      The following portions of the patient's history were reviewed and updated as appropriate: allergies, current medications, problem list, past medical history, past family history, past medical history, and past social history.    Review of Systems   Constitutional: Negative.    HENT: Positive for postnasal drip.         Ear pressure   Eyes: Positive for itching.   Respiratory: Negative.    Cardiovascular: Negative.    Gastrointestinal: Negative.    Endocrine: Negative.    Genitourinary: Negative.    Musculoskeletal:  "Negative.    Skin: Negative.    Allergic/Immunologic: Negative.    Neurological: Negative.    Hematological: Negative.    Psychiatric/Behavioral: Negative.        OBJECTIVE    /80 (BP Location: Left arm, Patient Position: Sitting, Cuff Size: Adult)   Pulse 77   Temp 96 °F (35.6 °C) (Tympanic)   Resp 16   Ht 180.3 cm (70.98\")   Wt 71.2 kg (157 lb)   SpO2 98%   BMI 21.91 kg/m²   Body mass index is 21.91 kg/m².  Nursing notes and vital signs reviewed.    Physical Exam   Constitutional: She is oriented to person, place, and time. She appears well-developed and well-nourished. No distress.   HENT:   Head: Normocephalic.   Right Ear: Hearing, tympanic membrane, external ear and ear canal normal.   Left Ear: Hearing, tympanic membrane, external ear and ear canal normal.   Nose: Mucosal edema and rhinorrhea present.   Mouth/Throat: Uvula is midline, oropharynx is clear and moist and mucous membranes are normal. No tonsillar exudate.   Eyes: Pupils are equal, round, and reactive to light. Conjunctivae, EOM and lids are normal.   Neck: Normal range of motion. Neck supple. No thyroid mass and no thyromegaly present.   Cardiovascular: Regular rhythm, S1 normal and S2 normal. Exam reveals no gallop and no friction rub.   No murmur heard.  Pulmonary/Chest: Effort normal and breath sounds normal. She has no wheezes. She has no rhonchi. She has no rales.   Abdominal: Soft. Bowel sounds are normal. There is no hepatomegaly. There is no tenderness. There is no guarding. No hernia.   Musculoskeletal: Normal range of motion. She exhibits no edema or deformity.   Lymphadenopathy:     She has no cervical adenopathy.   Neurological: She is alert and oriented to person, place, and time. She has normal strength and normal reflexes. No cranial nerve deficit or sensory deficit.   Skin: Skin is warm, dry and intact. No lesion and no rash noted.   Psychiatric: She has a normal mood and affect. Her speech is normal and behavior is " normal. Thought content normal. She is attentive.       Recent Results (from the past 672 hour(s))   CBC & Differential    Collection Time: 08/04/20 11:08 AM   Result Value Ref Range    WBC 5.69 3.40 - 10.80 10*3/mm3    RBC 4.05 3.77 - 5.28 10*6/mm3    Hemoglobin 15.0 12.0 - 15.9 g/dL    Hematocrit 43.6 34.0 - 46.6 %    .7 (H) 79.0 - 97.0 fL    MCH 37.0 (H) 26.6 - 33.0 pg    MCHC 34.4 31.5 - 35.7 g/dL    RDW 12.5 12.3 - 15.4 %    Platelets 145 140 - 450 10*3/mm3    nRBC 0.0 0.0 - 0.2 /100 WBC   Comprehensive Metabolic Panel    Collection Time: 08/04/20 11:08 AM   Result Value Ref Range    Glucose 73 65 - 99 mg/dL    BUN 11 6 - 20 mg/dL    Creatinine 0.68 0.57 - 1.00 mg/dL    eGFR Non African Am 100 >60 mL/min/1.73    eGFR African Am 121 >60 mL/min/1.73    BUN/Creatinine Ratio 16.2 7.0 - 25.0    Sodium 139 136 - 145 mmol/L    Potassium 4.4 3.5 - 5.2 mmol/L    Chloride 99 98 - 107 mmol/L    Total CO2 20.9 (L) 22.0 - 29.0 mmol/L    Calcium 9.6 8.6 - 10.5 mg/dL    Total Protein 6.8 6.0 - 8.5 g/dL    Albumin 5.00 3.50 - 5.20 g/dL    Globulin 1.8 gm/dL    A/G Ratio 2.8 g/dL    Total Bilirubin 0.4 0.0 - 1.2 mg/dL    Alkaline Phosphatase 46 39 - 117 U/L    AST (SGOT) 72 (H) 1 - 32 U/L    ALT (SGPT) 77 (H) 1 - 33 U/L   Hepatitis C Antibody    Collection Time: 08/04/20 11:08 AM   Result Value Ref Range    Hep C Virus Ab <0.1 0.0 - 0.9 s/co ratio   Lipid Panel With / Chol / HDL Ratio    Collection Time: 08/04/20 11:08 AM   Result Value Ref Range    Total Cholesterol 185 0 - 200 mg/dL    Triglycerides 134 0 - 150 mg/dL    HDL Cholesterol 65 (H) 40 - 60 mg/dL    VLDL Cholesterol 26.8 mg/dL    LDL Cholesterol  93 0 - 100 mg/dL    Chol/HDL Ratio 2.85    Thyroid Kent Profile    Collection Time: 08/04/20 11:08 AM   Result Value Ref Range    TSH 1.770 0.450 - 4.500 uIU/mL   Vitamin D 25 Hydroxy    Collection Time: 08/04/20 11:08 AM   Result Value Ref Range    25 Hydroxy, Vitamin D 57.4 30.0 - 100.0 ng/mL   Manual  Differential    Collection Time: 08/04/20 11:08 AM   Result Value Ref Range    Neutrophil Rel % 66.0 42.7 - 76.0 %    Lymphocyte Rel % 29.9 19.6 - 45.3 %    Monocyte Rel % 1.0 (L) 5.0 - 12.0 %    Eosinophil Rel % 1.0 0.3 - 6.2 %    Basophil Rel % 2.1 (H) 0.0 - 1.5 %    Neutrophils Absolute 3.76 1.70 - 7.00 10*3/mm3    Lymphocytes Absolute 1.70 0.70 - 3.10 10*3/mm3    Monocytes Absolute 0.06 (L) 0.10 - 0.90 10*3/mm3    Eosinophil Abs 0.06 0.00 - 0.40 10*3/mm3    Basophils Absolute 0.12 0.00 - 0.20 10*3/mm3    Differential Comment Comment     Comment Comment     Plt Comment Comment          ASSESSMENT AND PLAN    Elin was seen today for annual exam.    Diagnoses and all orders for this visit:    Encounter for wellness examination in adult    Environmental allergies    Elevated liver enzymes  -     AST; Future  -     ALT; Future    Tobacco abuse disorder        Labs reviewed. Preventative counseling completed.    Zyrtec daily. Add OTC Flonase as well.    No ETOH x 4 weeks then recheck LFTs.    Counseled re continued smoking cessation.        Medications, including side effects, were discussed with the patient. Patient verbalized understanding.  The plan of care was discussed. All questions were answered. Patient verbalized understanding.        Return in about 1 year (around 8/14/2021) for Annual physical., or sooner if needed.

## 2020-08-19 ENCOUNTER — RESULTS ENCOUNTER (OUTPATIENT)
Dept: INTERNAL MEDICINE | Facility: CLINIC | Age: 35
End: 2020-08-19

## 2020-08-19 DIAGNOSIS — R74.8 ELEVATED LIVER ENZYMES: ICD-10-CM

## 2020-09-02 ENCOUNTER — TELEPHONE (OUTPATIENT)
Dept: INTERNAL MEDICINE | Facility: CLINIC | Age: 35
End: 2020-09-02

## 2020-09-02 NOTE — TELEPHONE ENCOUNTER
PATIENT STATES HER HANDS ARE PEELING AND NEEDS MEDICATION FOR THIS.  PATIENT WILL UPLOAD PICTURE THROUGH MY CHART.  PLEASE ADVISE    PATIENT CALL BACK #[phone]    PHARMACY: [id number] - [facility] [address] AT [facility] OF [address] - [phone]  - [phone] FX

## 2020-09-30 ENCOUNTER — TELEPHONE (OUTPATIENT)
Dept: GASTROENTEROLOGY | Facility: CLINIC | Age: 35
End: 2020-09-30

## 2020-09-30 ENCOUNTER — LAB REQUISITION (OUTPATIENT)
Dept: LAB | Facility: HOSPITAL | Age: 35
End: 2020-09-30

## 2020-09-30 DIAGNOSIS — Z00.00 ENCOUNTER FOR GENERAL ADULT MEDICAL EXAMINATION WITHOUT ABNORMAL FINDINGS: ICD-10-CM

## 2021-02-24 ENCOUNTER — OFFICE VISIT (OUTPATIENT)
Dept: INTERNAL MEDICINE | Facility: CLINIC | Age: 36
End: 2021-02-24

## 2021-02-24 VITALS
DIASTOLIC BLOOD PRESSURE: 68 MMHG | BODY MASS INDEX: 23.24 KG/M2 | SYSTOLIC BLOOD PRESSURE: 100 MMHG | TEMPERATURE: 97.7 F | OXYGEN SATURATION: 100 % | HEIGHT: 71 IN | HEART RATE: 73 BPM | WEIGHT: 166 LBS | RESPIRATION RATE: 16 BRPM

## 2021-02-24 DIAGNOSIS — L29.9 PRURITUS OF SKIN: Primary | ICD-10-CM

## 2021-02-24 PROCEDURE — 99213 OFFICE O/P EST LOW 20 MIN: CPT | Performed by: FAMILY MEDICINE

## 2021-02-24 RX ORDER — HYDROXYZINE HYDROCHLORIDE 25 MG/1
25-50 TABLET, FILM COATED ORAL EVERY 6 HOURS PRN
Qty: 45 TABLET | Refills: 0 | Status: SHIPPED | OUTPATIENT
Start: 2021-02-24 | End: 2022-03-30

## 2021-02-24 RX ORDER — TRIAMCINOLONE ACETONIDE 5 MG/G
CREAM TOPICAL 3 TIMES DAILY
Qty: 30 G | Refills: 0 | Status: SHIPPED | OUTPATIENT
Start: 2021-02-24 | End: 2022-03-30

## 2021-02-24 NOTE — PROGRESS NOTES
Kelin Quiñonez is a 35 y.o. female presenting today for follow up of   Chief Complaint   Patient presents with   • Rash     All over       History of Present Illness     Pt presents with the complaint of skin itching X 2 weeks.  She first noticed itching of her shins and has been scratching them, even while asleep.  The itching seems to be spreading up her legs and to her torso.  She hasn't noticed any bites.  She denies travel or exposure to new products/detergents/medications.  Denies other systemic symptoms.  She had mild transaminase elevations 6 months ago but didn't come in for her repeat blood tests as instructed.    There is no problem list on file for this patient.      Current Outpatient Medications on File Prior to Visit   Medication Sig   • cetirizine (zyrTEC) 10 MG tablet Take 10 mg by mouth Daily As Needed.   • Multiple Vitamin (MULTIVITAMIN PO) Take  by mouth.   • VITAMIN D PO Take  by mouth.   • [DISCONTINUED] ZINC OXIDE PO Take  by mouth.     No current facility-administered medications on file prior to visit.           The following portions of the patient's history were reviewed and updated as appropriate: allergies, current medications, past family history, past medical history, past social history, past surgical history and problem list.    Review of Systems   Constitutional: Negative.    HENT: Negative.    Eyes: Negative.    Respiratory: Negative.    Cardiovascular: Negative.    Gastrointestinal: Negative.    Endocrine: Negative.    Genitourinary: Negative.    Skin: Positive for dry skin.        Itching   Allergic/Immunologic: Positive for environmental allergies.   Neurological: Negative.    Hematological: Negative.    Psychiatric/Behavioral: Positive for sleep disturbance.       Objective   Vitals:    02/24/21 1158   BP: 100/68   BP Location: Left arm   Patient Position: Sitting   Cuff Size: Large Adult   Pulse: 73   Resp: 16   Temp: 97.7 °F (36.5 °C)   TempSrc: Temporal  "  SpO2: 100%   Weight: 75.3 kg (166 lb)   Height: 180.3 cm (71\")       BP Readings from Last 3 Encounters:   02/24/21 100/68   08/14/20 106/80   08/04/20 110/88        Wt Readings from Last 3 Encounters:   02/24/21 75.3 kg (166 lb)   08/14/20 71.2 kg (157 lb)   08/04/20 71.9 kg (158 lb 9.6 oz)        Body mass index is 23.15 kg/m².  Nursing notes and vitals reviewed.    Physical Exam  Vitals signs and nursing note reviewed.   Constitutional:       General: She is not in acute distress.     Appearance: Normal appearance. She is not ill-appearing.   HENT:      Head: Normocephalic and atraumatic.      Mouth/Throat:      Mouth: Mucous membranes are moist.   Eyes:      Extraocular Movements: Extraocular movements intact.      Conjunctiva/sclera: Conjunctivae normal.   Pulmonary:      Effort: Pulmonary effort is normal. No respiratory distress.   Musculoskeletal:      Right lower leg: No edema.      Left lower leg: No edema.   Skin:     General: Skin is warm.      Capillary Refill: Capillary refill takes less than 2 seconds.      Comments: Excoriations shins and lower torso.  Dry skin.  No evidence of insect bites or burrowing.  No inflammation.   Neurological:      General: No focal deficit present.      Mental Status: She is alert and oriented to person, place, and time.   Psychiatric:         Mood and Affect: Mood normal.         Behavior: Behavior normal.         No results found for this or any previous visit (from the past 672 hour(s)).      Assessment/Plan   Diagnoses and all orders for this visit:    1. Pruritus of skin (Primary)  -     Comprehensive Metabolic Panel  -     CBC & Differential  -     Sedimentation Rate  -     TSH  -     Ferritin  -     hydrOXYzine (ATARAX) 25 MG tablet; Take 1-2 tablets by mouth Every 6 (Six) Hours As Needed for Itching.  Dispense: 45 tablet; Refill: 0  -     triamcinolone (KENALOG) 0.5 % cream; Apply  topically to the appropriate area as directed 3 (Three) Times a Day.  Dispense: " 30 g; Refill: 0      Pruritis of skin with no discernible rash other than excoriations from itching.  No medication changes or new exposures.  May be due to dry skin.  Check labs today.  Hydroxyzine for symptom relief.  Aggressive moisturization of skin.  Topical steroid to be used sparingly.          Medications, including side effects, were discussed with the patient. Patient verbalized understanding.  The plan of care was discussed. All questions were answered. Patient verbalized understanding.      No follow-ups on file.

## 2021-02-25 LAB
ALBUMIN SERPL-MCNC: 4.8 G/DL (ref 3.8–4.8)
ALBUMIN/GLOB SERPL: 1.7 {RATIO} (ref 1.2–2.2)
ALP SERPL-CCNC: 39 IU/L (ref 39–117)
ALT SERPL-CCNC: 20 IU/L (ref 0–32)
AST SERPL-CCNC: 20 IU/L (ref 0–40)
BASOPHILS # BLD AUTO: 0 X10E3/UL (ref 0–0.2)
BASOPHILS NFR BLD AUTO: 1 %
BILIRUB SERPL-MCNC: 0.9 MG/DL (ref 0–1.2)
BUN SERPL-MCNC: 14 MG/DL (ref 6–20)
BUN/CREAT SERPL: 17 (ref 9–23)
CALCIUM SERPL-MCNC: 10 MG/DL (ref 8.7–10.2)
CHLORIDE SERPL-SCNC: 101 MMOL/L (ref 96–106)
CO2 SERPL-SCNC: 21 MMOL/L (ref 20–29)
CREAT SERPL-MCNC: 0.82 MG/DL (ref 0.57–1)
EOSINOPHIL # BLD AUTO: 0.2 X10E3/UL (ref 0–0.4)
EOSINOPHIL NFR BLD AUTO: 4 %
ERYTHROCYTE [DISTWIDTH] IN BLOOD BY AUTOMATED COUNT: 11.9 % (ref 11.7–15.4)
ERYTHROCYTE [SEDIMENTATION RATE] IN BLOOD BY WESTERGREN METHOD: 6 MM/HR (ref 0–32)
FERRITIN SERPL-MCNC: 160 NG/ML (ref 15–150)
GLOBULIN SER CALC-MCNC: 2.9 G/DL (ref 1.5–4.5)
GLUCOSE SERPL-MCNC: 77 MG/DL (ref 65–99)
HCT VFR BLD AUTO: 43.6 % (ref 34–46.6)
HGB BLD-MCNC: 14.7 G/DL (ref 11.1–15.9)
IMM GRANULOCYTES # BLD AUTO: 0 X10E3/UL (ref 0–0.1)
IMM GRANULOCYTES NFR BLD AUTO: 0 %
LYMPHOCYTES # BLD AUTO: 1.7 X10E3/UL (ref 0.7–3.1)
LYMPHOCYTES NFR BLD AUTO: 29 %
MCH RBC QN AUTO: 32.5 PG (ref 26.6–33)
MCHC RBC AUTO-ENTMCNC: 33.7 G/DL (ref 31.5–35.7)
MCV RBC AUTO: 97 FL (ref 79–97)
MONOCYTES # BLD AUTO: 0.4 X10E3/UL (ref 0.1–0.9)
MONOCYTES NFR BLD AUTO: 7 %
NEUTROPHILS # BLD AUTO: 3.5 X10E3/UL (ref 1.4–7)
NEUTROPHILS NFR BLD AUTO: 59 %
PLATELET # BLD AUTO: 197 X10E3/UL (ref 150–450)
POTASSIUM SERPL-SCNC: 4.3 MMOL/L (ref 3.5–5.2)
PROT SERPL-MCNC: 7.7 G/DL (ref 6–8.5)
RBC # BLD AUTO: 4.52 X10E6/UL (ref 3.77–5.28)
SODIUM SERPL-SCNC: 138 MMOL/L (ref 134–144)
TSH SERPL DL<=0.005 MIU/L-ACNC: 1.8 UIU/ML (ref 0.45–4.5)
WBC # BLD AUTO: 5.8 X10E3/UL (ref 3.4–10.8)

## 2021-02-25 RX ORDER — PERMETHRIN 50 MG/G
CREAM TOPICAL ONCE
Qty: 60 G | Refills: 1 | Status: SHIPPED | OUTPATIENT
Start: 2021-02-25 | End: 2021-02-25

## 2021-04-16 ENCOUNTER — BULK ORDERING (OUTPATIENT)
Dept: CASE MANAGEMENT | Facility: OTHER | Age: 36
End: 2021-04-16

## 2021-04-16 DIAGNOSIS — Z23 IMMUNIZATION DUE: ICD-10-CM

## 2021-08-06 ENCOUNTER — TELEPHONE (OUTPATIENT)
Dept: INTERNAL MEDICINE | Facility: CLINIC | Age: 36
End: 2021-08-06

## 2021-08-09 DIAGNOSIS — Z00.00 ROUTINE HEALTH MAINTENANCE: Primary | ICD-10-CM

## 2022-01-13 ENCOUNTER — OFFICE VISIT (OUTPATIENT)
Dept: INTERNAL MEDICINE | Facility: CLINIC | Age: 37
End: 2022-01-13

## 2022-01-13 VITALS
TEMPERATURE: 98.9 F | OXYGEN SATURATION: 99 % | HEIGHT: 71 IN | HEART RATE: 70 BPM | WEIGHT: 160 LBS | SYSTOLIC BLOOD PRESSURE: 100 MMHG | DIASTOLIC BLOOD PRESSURE: 76 MMHG | BODY MASS INDEX: 22.4 KG/M2 | RESPIRATION RATE: 20 BRPM

## 2022-01-13 DIAGNOSIS — Z00.00 ENCOUNTER FOR WELLNESS EXAMINATION IN ADULT: Primary | ICD-10-CM

## 2022-01-13 PROCEDURE — 99395 PREV VISIT EST AGE 18-39: CPT | Performed by: NURSE PRACTITIONER

## 2022-01-13 NOTE — PROGRESS NOTES
"MITCH Worthington is a 36 y.o. female presenting for Annual Exam    Her current/chronic health conditions include:  There is no problem list on file for this patient.      Outpatient Medications Marked as Taking for the 1/13/22 encounter (Office Visit) with Laura Barclay APRN   Medication Sig Dispense Refill   • cetirizine (zyrTEC) 10 MG tablet Take 10 mg by mouth Daily As Needed.     • hydrOXYzine (ATARAX) 25 MG tablet Take 1-2 tablets by mouth Every 6 (Six) Hours As Needed for Itching. 45 tablet 0   • Multiple Vitamin (MULTIVITAMIN PO) Take  by mouth.     • triamcinolone (KENALOG) 0.5 % cream Apply  topically to the appropriate area as directed 3 (Three) Times a Day. 30 g 0   • VITAMIN D PO Take  by mouth.           Health Habits:  Nutrition: \"pretty good\" mostly keto, lots of vegetables and protein, some fruits, avoiding bread and pastas  Exercise: walking 3-5 miles QD      Screenings:  PAP: she plans to schedule this w/ GYN          The following portions of the patient's history were reviewed and updated as appropriate: allergies, current medications, problem list, past medical history, past family history, and past social history.    Review of Systems   Constitutional: Negative.    HENT: Negative.    Eyes: Negative.    Respiratory: Negative.    Cardiovascular: Negative.    Gastrointestinal: Negative.    Endocrine: Negative.    Genitourinary: Negative.    Musculoskeletal: Negative.    Skin: Negative.    Allergic/Immunologic: Negative.    Neurological: Negative.    Hematological: Negative.    Psychiatric/Behavioral: Negative.        OBJECTIVE    Vitals:    01/13/22 1456   BP: 100/76   Pulse: 70   Resp: 20   Temp: 98.9 °F (37.2 °C)   TempSrc: Temporal   SpO2: 99%   Weight: 72.6 kg (160 lb)   Height: 180.3 cm (70.98\")       BP Readings from Last 3 Encounters:   01/13/22 100/76   02/24/21 100/68   08/14/20 106/80        Wt Readings from Last 3 Encounters:   01/13/22 72.6 kg (160 lb)   02/24/21 75.3 kg " (166 lb)   08/14/20 71.2 kg (157 lb)        Body mass index is 22.33 kg/m².  Nursing notes and vital signs reviewed.      Physical Exam  Constitutional:       General: She is not in acute distress.     Appearance: Normal appearance. She is well-developed.   HENT:      Head: Normocephalic.      Right Ear: Hearing, tympanic membrane, ear canal and external ear normal.      Left Ear: Hearing, tympanic membrane, ear canal and external ear normal.      Nose: Nose normal. No mucosal edema or rhinorrhea.      Mouth/Throat:      Mouth: Mucous membranes are moist.      Pharynx: Oropharynx is clear. Uvula midline.   Eyes:      General: Lids are normal.      Extraocular Movements: Extraocular movements intact.      Conjunctiva/sclera: Conjunctivae normal.      Pupils: Pupils are equal, round, and reactive to light.   Neck:      Thyroid: No thyroid mass or thyromegaly.   Cardiovascular:      Rate and Rhythm: Regular rhythm.      Pulses: Normal pulses.      Heart sounds: S1 normal and S2 normal. No murmur heard.  No friction rub. No gallop.    Pulmonary:      Effort: Pulmonary effort is normal.      Breath sounds: Normal breath sounds. No wheezing, rhonchi or rales.   Abdominal:      General: Bowel sounds are normal.      Palpations: Abdomen is soft.      Tenderness: There is no abdominal tenderness. There is no guarding.      Hernia: No hernia is present.   Musculoskeletal:         General: No deformity. Normal range of motion.      Cervical back: Normal range of motion and neck supple.   Lymphadenopathy:      Cervical: No cervical adenopathy.   Skin:     General: Skin is warm and dry.      Findings: No lesion or rash.   Neurological:      General: No focal deficit present.      Mental Status: She is alert and oriented to person, place, and time.      Cranial Nerves: No cranial nerve deficit.      Sensory: No sensory deficit.      Motor: Motor function is intact.      Coordination: Coordination is intact.      Gait: Gait  normal.      Deep Tendon Reflexes: Reflexes are normal and symmetric.   Psychiatric:         Attention and Perception: She is attentive.         Mood and Affect: Mood and affect normal.         Speech: Speech normal.         Behavior: Behavior normal.         Thought Content: Thought content normal.         No results found for this or any previous visit (from the past 672 hour(s)).      ASSESSMENT AND PLAN    Diagnoses and all orders for this visit:    1. Encounter for wellness examination in adult (Primary)  -     CBC (No Diff)  -     Comprehensive Metabolic Panel  -     Lipid Panel With / Chol / HDL Ratio  -     TSH        Preventative counseling completed including relevant screenings, appropriate vaccinations, healthy nutrition, and appropriate physical activity.  Patient's Body mass index is 22.33 kg/m². indicating that she is within normal range (BMI 18.5-24.9). No BMI management plan needed..          The plan of care was discussed. All questions were answered. Patient verbalized understanding.        Return for fasting labs ASAP., or sooner if needed.     T(C): 36.4, Max: 36.4 (06-22 @ 08:44)  HR: 60 (60 - 110)  BP: 98/55 (98/55 - 111/65)  RR: 16 (16 - 16)  SpO2: 100% (100% - 100%)

## 2022-01-20 ENCOUNTER — OFFICE VISIT (OUTPATIENT)
Dept: INTERNAL MEDICINE | Facility: CLINIC | Age: 37
End: 2022-01-20

## 2022-01-20 VITALS
RESPIRATION RATE: 19 BRPM | WEIGHT: 162 LBS | BODY MASS INDEX: 22.68 KG/M2 | HEIGHT: 71 IN | HEART RATE: 89 BPM | OXYGEN SATURATION: 97 % | DIASTOLIC BLOOD PRESSURE: 62 MMHG | SYSTOLIC BLOOD PRESSURE: 120 MMHG | TEMPERATURE: 98.2 F

## 2022-01-20 DIAGNOSIS — K62.5 ANAL BLEEDING: Primary | ICD-10-CM

## 2022-01-20 PROCEDURE — 99213 OFFICE O/P EST LOW 20 MIN: CPT | Performed by: NURSE PRACTITIONER

## 2022-01-20 NOTE — PROGRESS NOTES
"Elin Quiñonez is a 36 y.o. female presenting today for   Chief Complaint   Patient presents with   • Rectal Bleeding       Subjective    History of Present Illness     She reports noticing some BRRB when wiping after BM. This began 2 days ago and occurred w/ 7-8 BMs over the course of 48 hours and has now resolved. The onset was a harder stool that required straining and then subsequent stools were more lose. She has an appt to Dr. Akbar w/ GI. PM of hemorrhoids.    The following portions of the patient's history were reviewed and updated as appropriate: allergies, current medications, problem list, past medical history, past surgical history, family history, and social history.    Review of Systems   Gastrointestinal: Positive for anal bleeding. Negative for abdominal pain and diarrhea.   Genitourinary: Negative for dysuria.         Objective    Vitals:    01/20/22 0922   BP: 120/62   Pulse: 89   Resp: 19   Temp: 98.2 °F (36.8 °C)   TempSrc: Temporal   SpO2: 97%   Weight: 73.5 kg (162 lb)   Height: 180.3 cm (70.98\")     Body mass index is 22.61 kg/m².  Nursing notes and vitals reviewed.    Physical Exam  Constitutional:       General: She is not in acute distress.     Appearance: She is well-developed.   Cardiovascular:      Rate and Rhythm: Regular rhythm.      Heart sounds: S1 normal and S2 normal.   Pulmonary:      Effort: Pulmonary effort is normal.      Breath sounds: Normal breath sounds.   Abdominal:      General: Abdomen is flat.      Palpations: Abdomen is soft. There is no hepatomegaly, splenomegaly or mass.      Tenderness: There is no abdominal tenderness. There is no right CVA tenderness or left CVA tenderness.   Neurological:      Mental Status: She is alert and oriented to person, place, and time.   Psychiatric:         Attention and Perception: She is attentive.         Behavior: Behavior normal.         Thought Content: Thought content normal.           Assessment and Plan    Diagnoses and " all orders for this visit:    1. Anal bleeding (Primary)      Min of 64 oz of water each.  High fiber vegetables.  Avoid small dense particles - nuts, seeds, corn, popcorn.  OTC stool softener.  OTC Preparation H.      Medications, including side effects, were discussed with the patient. Patient verbalized understanding.  The plan of care was discussed. All questions were answered. Patient verbalized understanding.        F/U w/ GI as scheduled.

## 2022-01-21 LAB
ALBUMIN SERPL-MCNC: 4.8 G/DL (ref 3.8–4.8)
ALBUMIN/GLOB SERPL: 2.2 {RATIO} (ref 1.2–2.2)
ALP SERPL-CCNC: 45 IU/L (ref 44–121)
ALT SERPL-CCNC: 14 IU/L (ref 0–32)
AST SERPL-CCNC: 17 IU/L (ref 0–40)
BILIRUB SERPL-MCNC: 0.4 MG/DL (ref 0–1.2)
BUN SERPL-MCNC: 11 MG/DL (ref 6–20)
BUN/CREAT SERPL: 14 (ref 9–23)
CALCIUM SERPL-MCNC: 9.7 MG/DL (ref 8.7–10.2)
CHLORIDE SERPL-SCNC: 102 MMOL/L (ref 96–106)
CHOLEST SERPL-MCNC: 168 MG/DL (ref 100–199)
CHOLEST/HDLC SERPL: 2.5 RATIO (ref 0–4.4)
CO2 SERPL-SCNC: 20 MMOL/L (ref 20–29)
CREAT SERPL-MCNC: 0.8 MG/DL (ref 0.57–1)
ERYTHROCYTE [DISTWIDTH] IN BLOOD BY AUTOMATED COUNT: 12.2 % (ref 11.7–15.4)
GLOBULIN SER CALC-MCNC: 2.2 G/DL (ref 1.5–4.5)
GLUCOSE SERPL-MCNC: 105 MG/DL (ref 65–99)
HCT VFR BLD AUTO: 41.3 % (ref 34–46.6)
HDLC SERPL-MCNC: 68 MG/DL
HGB BLD-MCNC: 14.3 G/DL (ref 11.1–15.9)
LDLC SERPL CALC-MCNC: 86 MG/DL (ref 0–99)
MCH RBC QN AUTO: 33.7 PG (ref 26.6–33)
MCHC RBC AUTO-ENTMCNC: 34.6 G/DL (ref 31.5–35.7)
MCV RBC AUTO: 97 FL (ref 79–97)
PLATELET # BLD AUTO: 184 X10E3/UL (ref 150–450)
POTASSIUM SERPL-SCNC: 4.5 MMOL/L (ref 3.5–5.2)
PROT SERPL-MCNC: 7 G/DL (ref 6–8.5)
RBC # BLD AUTO: 4.24 X10E6/UL (ref 3.77–5.28)
SODIUM SERPL-SCNC: 137 MMOL/L (ref 134–144)
TRIGL SERPL-MCNC: 71 MG/DL (ref 0–149)
TSH SERPL DL<=0.005 MIU/L-ACNC: 1.82 UIU/ML (ref 0.45–4.5)
VLDLC SERPL CALC-MCNC: 14 MG/DL (ref 5–40)
WBC # BLD AUTO: 4.4 X10E3/UL (ref 3.4–10.8)

## 2022-02-09 ENCOUNTER — TELEPHONE (OUTPATIENT)
Dept: GASTROENTEROLOGY | Facility: CLINIC | Age: 37
End: 2022-02-09

## 2022-02-09 ENCOUNTER — OFFICE VISIT (OUTPATIENT)
Dept: GASTROENTEROLOGY | Facility: CLINIC | Age: 37
End: 2022-02-09

## 2022-02-09 VITALS
SYSTOLIC BLOOD PRESSURE: 122 MMHG | BODY MASS INDEX: 22.54 KG/M2 | DIASTOLIC BLOOD PRESSURE: 68 MMHG | WEIGHT: 161 LBS | HEART RATE: 76 BPM | OXYGEN SATURATION: 99 % | HEIGHT: 71 IN

## 2022-02-09 DIAGNOSIS — K59.00 CONSTIPATION, UNSPECIFIED CONSTIPATION TYPE: Primary | ICD-10-CM

## 2022-02-09 PROCEDURE — 99214 OFFICE O/P EST MOD 30 MIN: CPT | Performed by: INTERNAL MEDICINE

## 2022-02-09 RX ORDER — SODIUM CHLORIDE, SODIUM LACTATE, POTASSIUM CHLORIDE, CALCIUM CHLORIDE 600; 310; 30; 20 MG/100ML; MG/100ML; MG/100ML; MG/100ML
30 INJECTION, SOLUTION INTRAVENOUS CONTINUOUS
Status: CANCELLED | OUTPATIENT
Start: 2022-03-31

## 2022-02-09 NOTE — PROGRESS NOTES
Chief Complaint   Patient presents with   • Constipation   • Diarrhea   • Rectal Bleeding     Subjective   HPI  Elin Quiñonez is a 36 y.o. female who presents today for new patient evaluation.  She complains of issues with constipation and intermittent rectal bleeding.  She is had intermittent bleeding since her last child was born about 7 years ago but seems to be more frequent over the last year or so.  Predominantly red blood noted on the tissue sometimes seen in the toilet after bowel movement.  Has had more irregularity with bowel habit as well.  Sensation of constipation with incomplete defecation.  Will occasionally take a stool softener with good response.  She does endorse a high-fiber diet.  She is never had prior colonoscopy although previously had colonoscopy scheduled with Dr. Akbar in 2020.  She is not aware of any significant family history of colorectal polyps or colon cancer.    Objective   Vitals:    02/09/22 0917   BP: 122/68   Pulse: 76   SpO2: 99%     Physical Exam  Vitals reviewed.   Constitutional:       Appearance: She is well-developed.   HENT:      Head: Normocephalic and atraumatic.   Abdominal:      General: Bowel sounds are normal. There is no distension.      Palpations: Abdomen is soft. There is no mass.      Tenderness: There is no abdominal tenderness.      Hernia: No hernia is present.   Skin:     General: Skin is warm and dry.   Neurological:      Mental Status: She is alert and oriented to person, place, and time.   Psychiatric:         Behavior: Behavior normal.         Thought Content: Thought content normal.         Judgment: Judgment normal.              Assessment/Plan   Assessment:     1. Constipation, unspecified constipation type    2.      Rectal bleeding    Plan:   Start regular stool softener  Continue high fiber diet  Schedule colonoscopy, case request submitted          Renny Mancilla M.D.  [facility], [facility]  [address] [place] [address]  [address]  [place] [address]  Office: [phone]

## 2022-02-09 NOTE — TELEPHONE ENCOUNTER
WISAM patient in office for colonoscopy. Scheduled 03/31/2022 with arrival time 8:30am. Prep packet given to patient in office. Patient advised arrival time may vary. Spoke with Negin--Abimbola

## 2022-03-14 ENCOUNTER — HOSPITAL ENCOUNTER (EMERGENCY)
Facility: HOSPITAL | Age: 37
Discharge: HOME | End: 2022-03-14
Attending: EMERGENCY MEDICINE | Admitting: EMERGENCY MEDICINE

## 2022-03-14 VITALS
DIASTOLIC BLOOD PRESSURE: 90 MMHG | WEIGHT: 160 LBS | HEIGHT: 71 IN | RESPIRATION RATE: 16 BRPM | OXYGEN SATURATION: 98 % | TEMPERATURE: 98.7 F | SYSTOLIC BLOOD PRESSURE: 137 MMHG | BODY MASS INDEX: 22.4 KG/M2 | HEART RATE: 80 BPM

## 2022-03-14 DIAGNOSIS — K62.5 BRIGHT RED RECTAL BLEEDING: Primary | ICD-10-CM

## 2022-03-14 LAB
ANION GAP SERPL CALCULATED.3IONS-SCNC: 12.4 MMOL/L (ref 5–15)
APTT PPP: 26 SECONDS (ref 24.3–38.1)
BASOPHILS # BLD AUTO: 0.02 10*3/MM3 (ref 0–0.2)
BASOPHILS NFR BLD AUTO: 0.4 % (ref 0–1.5)
BUN SERPL-MCNC: 7 MG/DL (ref 6–20)
BUN/CREAT SERPL: 9.6 (ref 7–25)
CALCIUM SPEC-SCNC: 9.3 MG/DL (ref 8.6–10.5)
CHLORIDE SERPL-SCNC: 98 MMOL/L (ref 98–107)
CO2 SERPL-SCNC: 22.6 MMOL/L (ref 22–29)
CREAT SERPL-MCNC: 0.73 MG/DL (ref 0.57–1)
DEPRECATED RDW RBC AUTO: 43.5 FL (ref 37–54)
EGFRCR SERPLBLD CKD-EPI 2021: 110.8 ML/MIN/1.73
EOSINOPHIL # BLD AUTO: 0.08 10*3/MM3 (ref 0–0.4)
EOSINOPHIL NFR BLD AUTO: 1.8 % (ref 0.3–6.2)
ERYTHROCYTE [DISTWIDTH] IN BLOOD BY AUTOMATED COUNT: 11.8 % (ref 12.3–15.4)
GLUCOSE SERPL-MCNC: 96 MG/DL (ref 65–99)
HCT VFR BLD AUTO: 42.6 % (ref 34–46.6)
HGB BLD-MCNC: 14.2 G/DL (ref 12–15.9)
IMM GRANULOCYTES # BLD AUTO: 0.02 10*3/MM3 (ref 0–0.05)
IMM GRANULOCYTES NFR BLD AUTO: 0.4 % (ref 0–0.5)
INR PPP: 0.97 (ref 0.9–1.1)
LYMPHOCYTES # BLD AUTO: 1.06 10*3/MM3 (ref 0.7–3.1)
LYMPHOCYTES NFR BLD AUTO: 23.5 % (ref 19.6–45.3)
MCH RBC QN AUTO: 32.9 PG (ref 26.6–33)
MCHC RBC AUTO-ENTMCNC: 33.3 G/DL (ref 31.5–35.7)
MCV RBC AUTO: 98.6 FL (ref 79–97)
MONOCYTES # BLD AUTO: 0.3 10*3/MM3 (ref 0.1–0.9)
MONOCYTES NFR BLD AUTO: 6.7 % (ref 5–12)
NEUTROPHILS NFR BLD AUTO: 3.03 10*3/MM3 (ref 1.7–7)
NEUTROPHILS NFR BLD AUTO: 67.2 % (ref 42.7–76)
PLATELET # BLD AUTO: 181 10*3/MM3 (ref 140–450)
PMV BLD AUTO: 10.5 FL (ref 6–12)
POTASSIUM SERPL-SCNC: 4 MMOL/L (ref 3.5–5.2)
PROTHROMBIN TIME: 13.1 SECONDS (ref 12.1–15)
RBC # BLD AUTO: 4.32 10*6/MM3 (ref 3.77–5.28)
SODIUM SERPL-SCNC: 133 MMOL/L (ref 136–145)
WBC NRBC COR # BLD: 4.51 10*3/MM3 (ref 3.4–10.8)

## 2022-03-14 PROCEDURE — 99284 EMERGENCY DEPT VISIT MOD MDM: CPT | Performed by: EMERGENCY MEDICINE

## 2022-03-14 PROCEDURE — 80048 BASIC METABOLIC PNL TOTAL CA: CPT | Performed by: EMERGENCY MEDICINE

## 2022-03-14 PROCEDURE — 85610 PROTHROMBIN TIME: CPT | Performed by: EMERGENCY MEDICINE

## 2022-03-14 PROCEDURE — 85025 COMPLETE CBC W/AUTO DIFF WBC: CPT | Performed by: EMERGENCY MEDICINE

## 2022-03-14 PROCEDURE — 85730 THROMBOPLASTIN TIME PARTIAL: CPT | Performed by: EMERGENCY MEDICINE

## 2022-03-14 PROCEDURE — 99283 EMERGENCY DEPT VISIT LOW MDM: CPT

## 2022-03-14 NOTE — ED PROVIDER NOTES
Subjective     History provided by:  Patient    History of Present Illness    · Chief complaint: Rectal bleeding    · Location: From the rectum    · Quality/Severity: The patient has bright red blood mixed with stool.  She has this about 2-3 times a day.  Last night she had lexi blood without stool when she urinated that occurred once.    · Timing/Onset: Started 2 days ago.    · Modifying Factors: Patient has a history of rectal bleeding.    · Associated symptoms: Denies abdominal pain.  Denies nausea or vomiting.  She does report some low back pain.    · Narrative: The patient is a 36-year-old white female complaining of bright red blood mixed with stool the last 2 days.  She has had 2-3 bloody bowel movements daily over the last 2 days.  Last night she went to urinate and passed lexi blood without stool.  The patient has a history of rectal bleeding and was scheduled by Dr. Akbar for a colonoscopy in October 2020, but never went through with it.  Recently she has been evaluated by Dr. Mancilla, gastroenterology, on 2/9/2022 for the rectal bleeding and is scheduled for a colonoscopy in a couple of weeks.  The patient states she has had rectal bleeding intermittently for the last 7 years since she gave birth to her child.  She states the bleeding was attributed to internal hemorrhoids.  She does not currently have an external hemorrhoid.  She is not having abdominal pain.  She denies any lightheadedness.    Review of Systems   Constitutional: Negative for activity change, appetite change, chills, diaphoresis, fatigue and fever.   HENT: Negative for congestion, dental problem, ear pain, hearing loss, mouth sores, postnasal drip, rhinorrhea, sinus pressure, sore throat and voice change.    Eyes: Negative for photophobia, pain, discharge, redness and visual disturbance.   Respiratory: Negative for cough, chest tightness, shortness of breath, wheezing and stridor.    Cardiovascular: Negative for chest pain,  "palpitations and leg swelling.   Gastrointestinal: Positive for anal bleeding and blood in stool. Negative for abdominal pain, diarrhea, nausea and vomiting.   Genitourinary: Negative for difficulty urinating, dysuria, flank pain, frequency, hematuria, pelvic pain, urgency and vaginal bleeding.   Musculoskeletal: Negative for arthralgias, back pain, gait problem, joint swelling, myalgias, neck pain and neck stiffness.   Skin: Negative for color change and rash.   Neurological: Negative for dizziness, tremors, seizures, syncope, facial asymmetry, speech difficulty, weakness, light-headedness, numbness and headaches.   Hematological: Negative for adenopathy.   Psychiatric/Behavioral: Negative for confusion and decreased concentration. The patient is nervous/anxious.      Past Medical History:   Diagnosis Date   • Depression      /90   Pulse 80   Temp 98.7 °F (37.1 °C) (Oral)   Resp 16   Ht 180.3 cm (71\")   Wt 72.6 kg (160 lb)   LMP 2022   SpO2 98%   BMI 22.32 kg/m²     Past Medical History:   Diagnosis Date   • Depression        No Known Allergies    Past Surgical History:   Procedure Laterality Date   • INDUCED          Family History   Problem Relation Age of Onset   • Colon polyps Mother    • Thyroid disease Mother    • Thyroid disease Father    • Hypertension Father    • Hyperlipidemia Father    • Diabetes Paternal Grandfather    • Colon cancer Neg Hx        Social History     Socioeconomic History   • Marital status:    Tobacco Use   • Smoking status: Former Smoker     Packs/day: 1.50     Years: 7.00     Pack years: 10.50     Start date:      Quit date:      Years since quittin.2   • Smokeless tobacco: Never Used   Substance and Sexual Activity   • Alcohol use: Yes     Comment: 2-3 drinks daily   • Drug use: Never   • Sexual activity: Defer           Objective   Physical Exam  Vitals and nursing note reviewed.   Constitutional:       General: She is not in acute " distress.     Appearance: Normal appearance. She is well-developed and normal weight. She is not ill-appearing, toxic-appearing or diaphoretic.      Comments: The patient appears healthy in no acute distress.  Review of her vital signs: Her blood pressure is elevated 160/116, the remainder vital signs within normal limits.   HENT:      Head: Normocephalic and atraumatic.      Nose: Nose normal.      Mouth/Throat:      Mouth: Mucous membranes are moist.      Pharynx: Oropharynx is clear.   Eyes:      General: No scleral icterus.        Right eye: No discharge.         Left eye: No discharge.      Conjunctiva/sclera: Conjunctivae normal.      Pupils: Pupils are equal, round, and reactive to light.   Neck:      Thyroid: No thyromegaly.      Vascular: No JVD.   Cardiovascular:      Rate and Rhythm: Normal rate and regular rhythm.      Heart sounds: Normal heart sounds. No murmur heard.  Pulmonary:      Effort: Pulmonary effort is normal.      Breath sounds: Normal breath sounds. No wheezing or rales.   Chest:      Chest wall: No tenderness.   Abdominal:      General: Bowel sounds are normal. There is no distension.      Palpations: Abdomen is soft.      Tenderness: There is no abdominal tenderness.   Genitourinary:     Comments: The patient is external rectum appears normal.  There is no external hemorrhoids.  Digital exam of the rectum reveals no blood or stool.  Musculoskeletal:         General: No tenderness or deformity. Normal range of motion.      Cervical back: Normal range of motion and neck supple.   Lymphadenopathy:      Cervical: No cervical adenopathy.   Skin:     General: Skin is warm and dry.      Capillary Refill: Capillary refill takes less than 2 seconds.      Coloration: Skin is not pale.      Findings: No erythema or rash.   Neurological:      General: No focal deficit present.      Mental Status: She is alert and oriented to person, place, and time.      Cranial Nerves: No cranial nerve deficit.       Coordination: Coordination normal.      Comments: No focal motor sensory deficit   Psychiatric:         Mood and Affect: Mood normal.         Behavior: Behavior normal.         Thought Content: Thought content normal.         Judgment: Judgment normal.         Procedures           ED Course  ED Course as of 03/14/22 1110   Mon Mar 14, 2022   0908 Review the patient's laboratory studies: The patient's CBC has a normal white count of 4.5 with a normal differential.  Hemoglobin hematocrit were normal at 14.2/42.6.  Platelets were normal at 81.  BMP has a slightly low sodium of 133, but otherwise normal electrolytes and normal renal function test.  The BUN is normal at 7.  Coagulation studies within normal limits. [TP]   0918 Is my impression the patient's rectal bleeding is not life-threatening at this point and coming from the distal colon.  She appears stable to keep her colonoscopy appointment with Dr. Mancilla in 2 weeks.  I instructed her to contact her gastroenterologist or return to the ER should she has significantly heavier bleeding. [TP]      ED Course User Index  [TP] Gareth Casarez MD                                                 MDM  Number of Diagnoses or Management Options  Bright red rectal bleeding: established and worsening     Amount and/or Complexity of Data Reviewed  Clinical lab tests: ordered and reviewed    Risk of Complications, Morbidity, and/or Mortality  Presenting problems: high  Diagnostic procedures: moderate  Management options: moderate    Patient Progress  Patient progress: stable      Final diagnoses:   Bright red rectal bleeding       ED Disposition  ED Disposition     ED Disposition   Discharge    Condition   Stable    Comment   --             Renny Mancilla MD  [address] [facility] [address] [phone]    Go to   As scheduled for your colonoscopy.         Medication List      No changes were made to your prescriptions during this visit.          Labs Reviewed   CBC WITH AUTO DIFFERENTIAL - Abnormal; Notable for the following components:       Result Value    MCV 98.6 (*)     RDW 11.8 (*)     All other components within normal limits   BASIC METABOLIC PANEL - Abnormal; Notable for the following components:    Sodium 133 (*)     All other components within normal limits    Narrative:     GFR Normal >60  Chronic Kidney Disease <60  Kidney Failure <15     PROTIME-INR - Normal    Narrative:     Therapeutic Ranges for INR: 2.0-3.0 (PT 20-30)                              2.5-3.5 (PT 25-34)   APTT - Normal    Narrative:     PTT = The equivalent PTT values for the therapeutic range of heparin levels at 0.1 to 0.7 U/ml are 53 to 110 seconds.     CBC AND DIFFERENTIAL    Narrative:     The following orders were created for panel order CBC & Differential.  Procedure                               Abnormality         Status                     ---------                               -----------         ------                     CBC Auto Differential[[id number]]        Abnormal            Final result                 Please view results for these tests on the individual orders.     No orders to display          Medication List      No changes were made to your prescriptions during this visit.              Gareth Casarez MD  03/14/22 111

## 2022-03-31 ENCOUNTER — ANESTHESIA (OUTPATIENT)
Dept: GASTROENTEROLOGY | Facility: HOSPITAL | Age: 37
End: 2022-03-31

## 2022-03-31 ENCOUNTER — ANESTHESIA EVENT (OUTPATIENT)
Dept: GASTROENTEROLOGY | Facility: HOSPITAL | Age: 37
End: 2022-03-31

## 2022-03-31 ENCOUNTER — HOSPITAL ENCOUNTER (OUTPATIENT)
Facility: HOSPITAL | Age: 37
Setting detail: HOSPITAL OUTPATIENT SURGERY
Discharge: HOME | End: 2022-03-31
Attending: INTERNAL MEDICINE | Admitting: INTERNAL MEDICINE

## 2022-03-31 VITALS
HEART RATE: 70 BPM | HEIGHT: 71 IN | WEIGHT: 155 LBS | SYSTOLIC BLOOD PRESSURE: 108 MMHG | RESPIRATION RATE: 16 BRPM | BODY MASS INDEX: 21.7 KG/M2 | DIASTOLIC BLOOD PRESSURE: 74 MMHG | OXYGEN SATURATION: 100 %

## 2022-03-31 DIAGNOSIS — K59.00 CONSTIPATION, UNSPECIFIED CONSTIPATION TYPE: ICD-10-CM

## 2022-03-31 LAB
B-HCG UR QL: NEGATIVE
EXPIRATION DATE: NORMAL
INTERNAL NEGATIVE CONTROL: NEGATIVE
INTERNAL POSITIVE CONTROL: POSITIVE
Lab: NORMAL

## 2022-03-31 PROCEDURE — 25010000002 PROPOFOL 10 MG/ML EMULSION: Performed by: NURSE ANESTHETIST, CERTIFIED REGISTERED

## 2022-03-31 PROCEDURE — 88305 TISSUE EXAM BY PATHOLOGIST: CPT | Performed by: INTERNAL MEDICINE

## 2022-03-31 PROCEDURE — 81025 URINE PREGNANCY TEST: CPT | Performed by: INTERNAL MEDICINE

## 2022-03-31 PROCEDURE — 45380 COLONOSCOPY AND BIOPSY: CPT | Performed by: INTERNAL MEDICINE

## 2022-03-31 RX ORDER — SODIUM CHLORIDE, SODIUM LACTATE, POTASSIUM CHLORIDE, CALCIUM CHLORIDE 600; 310; 30; 20 MG/100ML; MG/100ML; MG/100ML; MG/100ML
30 INJECTION, SOLUTION INTRAVENOUS CONTINUOUS
Status: DISCONTINUED | OUTPATIENT
Start: 2022-03-31 | End: 2022-03-31 | Stop reason: HOSPADM

## 2022-03-31 RX ORDER — PROPOFOL 10 MG/ML
VIAL (ML) INTRAVENOUS CONTINUOUS PRN
Status: DISCONTINUED | OUTPATIENT
Start: 2022-03-31 | End: 2022-03-31 | Stop reason: SURG

## 2022-03-31 RX ORDER — LIDOCAINE HYDROCHLORIDE 20 MG/ML
INJECTION, SOLUTION INFILTRATION; PERINEURAL AS NEEDED
Status: DISCONTINUED | OUTPATIENT
Start: 2022-03-31 | End: 2022-03-31 | Stop reason: SURG

## 2022-03-31 RX ORDER — PROPOFOL 10 MG/ML
VIAL (ML) INTRAVENOUS AS NEEDED
Status: DISCONTINUED | OUTPATIENT
Start: 2022-03-31 | End: 2022-03-31 | Stop reason: SURG

## 2022-03-31 RX ADMIN — PROPOFOL 50 MG: 10 INJECTION, EMULSION INTRAVENOUS at 09:17

## 2022-03-31 RX ADMIN — PROPOFOL 50 MG: 10 INJECTION, EMULSION INTRAVENOUS at 09:18

## 2022-03-31 RX ADMIN — LIDOCAINE HYDROCHLORIDE 40 MG: 20 INJECTION, SOLUTION INFILTRATION; PERINEURAL at 09:12

## 2022-03-31 RX ADMIN — Medication 200 MCG/KG/MIN: at 09:12

## 2022-03-31 RX ADMIN — SODIUM CHLORIDE, POTASSIUM CHLORIDE, SODIUM LACTATE AND CALCIUM CHLORIDE 30 ML/HR: 600; 310; 30; 20 INJECTION, SOLUTION INTRAVENOUS at 09:09

## 2022-03-31 RX ADMIN — PROPOFOL 50 MG: 10 INJECTION, EMULSION INTRAVENOUS at 09:14

## 2022-03-31 RX ADMIN — PROPOFOL 50 MG: 10 INJECTION, EMULSION INTRAVENOUS at 09:15

## 2022-03-31 NOTE — ANESTHESIA POSTPROCEDURE EVALUATION
"Patient: Elin Quiñonez    Procedure Summary     Date: 03/31/22 Room / Location: [facility]    Anesthesia Start: 0910 Anesthesia Stop: 0931    Procedure: COLONOSCOPY INTO CECUM AND T.I. WITH COLD BIOPSY POLYPECTOMY (N/A ) Diagnosis:       Constipation, unspecified constipation type      (Constipation, unspecified constipation type [K59.00])    Surgeons: Renny Mancilla MD Provider: Renny Davis MD    Anesthesia Type: MAC ASA Status: 2          Anesthesia Type: MAC    Vitals  Vitals Value Taken Time   /74 03/31/22 0954   Temp     Pulse 70 03/31/22 0954   Resp 16 03/31/22 0954   SpO2 100 % 03/31/22 0954           Post Anesthesia Care and Evaluation    Patient location during evaluation: bedside  Patient participation: complete - patient participated  Level of consciousness: awake and alert  Pain management: adequate  Airway patency: patent  Anesthetic complications: No anesthetic complications    Cardiovascular status: acceptable  Respiratory status: acceptable  Hydration status: acceptable    Comments: /74 (BP Location: Left arm, Patient Position: Lying)   Pulse 70   Resp 16   Ht 180.3 cm (71\")   Wt 70.3 kg (155 lb)   LMP 03/03/2022   SpO2 100%   BMI 21.62 kg/m²       "

## 2022-03-31 NOTE — ANESTHESIA PREPROCEDURE EVALUATION
Anesthesia Evaluation     Patient summary reviewed   NPO Solid Status: > 8 hours  NPO Liquid Status: > 2 hours           Airway   Mallampati: I  TM distance: >3 FB  Neck ROM: full  Dental      Pulmonary     breath sounds clear to auscultation  (+) a smoker Former,   (-) shortness of breath  Cardiovascular   Exercise tolerance: good (4-7 METS)    Rhythm: regular  Rate: normal    (-) angina, CONLEY      Neuro/Psych  (+) psychiatric history Depression,    GI/Hepatic/Renal/Endo      Musculoskeletal     Abdominal    Substance History   (+) alcohol use, drug use (MJ)     OB/GYN          Other                        Anesthesia Plan    ASA 2     MAC   (MAC anesthesia discussed with patient and/or patient representative. Risks (including but not limited to intra-op awareness), benefits, and alternatives were discussed. Understanding was voiced with an agreement to proceed with a MAC technique and General as a backup option. I have explained other risks of anesthesia including but not limited to dental damage, corneal abrasion, nerve injury, MI, stroke, and death. All questions asked and answered.   )  intravenous induction     Anesthetic plan, all risks, benefits, and alternatives have been provided, discussed and informed consent has been obtained with: patient.        CODE STATUS:

## 2022-04-01 LAB
LAB AP CASE REPORT: NORMAL
PATH REPORT.FINAL DX SPEC: NORMAL
PATH REPORT.GROSS SPEC: NORMAL

## 2022-04-26 ENCOUNTER — TELEPHONE (OUTPATIENT)
Dept: GASTROENTEROLOGY | Facility: CLINIC | Age: 37
End: 2022-04-26

## 2022-04-26 NOTE — TELEPHONE ENCOUNTER
----- Message from Renny Mancilla MD sent at 4/25/2022  7:20 AM EDT -----  Benign - recommend colonoscopy at age 47  Office f/u 4-6 weeks with APC

## 2022-05-31 ENCOUNTER — OFFICE VISIT (OUTPATIENT)
Dept: OBSTETRICS AND GYNECOLOGY | Facility: CLINIC | Age: 37
End: 2022-05-31

## 2022-05-31 VITALS
DIASTOLIC BLOOD PRESSURE: 88 MMHG | SYSTOLIC BLOOD PRESSURE: 110 MMHG | HEIGHT: 71 IN | WEIGHT: 158 LBS | BODY MASS INDEX: 22.12 KG/M2

## 2022-05-31 DIAGNOSIS — Z01.419 ROUTINE GYNECOLOGICAL EXAMINATION: Primary | ICD-10-CM

## 2022-05-31 DIAGNOSIS — Z11.51 ENCOUNTER FOR SCREENING FOR HUMAN PAPILLOMAVIRUS (HPV): ICD-10-CM

## 2022-05-31 DIAGNOSIS — Z01.419 PAP SMEAR, LOW-RISK: ICD-10-CM

## 2022-05-31 DIAGNOSIS — N91.5 HYPOMENORRHEA: ICD-10-CM

## 2022-05-31 LAB

## 2022-05-31 PROCEDURE — 99395 PREV VISIT EST AGE 18-39: CPT | Performed by: NURSE PRACTITIONER

## 2022-05-31 PROCEDURE — 81002 URINALYSIS NONAUTO W/O SCOPE: CPT | Performed by: NURSE PRACTITIONER

## 2022-05-31 PROCEDURE — 99212 OFFICE O/P EST SF 10 MIN: CPT | Performed by: NURSE PRACTITIONER

## 2022-05-31 PROCEDURE — 81025 URINE PREGNANCY TEST: CPT | Performed by: NURSE PRACTITIONER

## 2022-06-01 ENCOUNTER — PATIENT ROUNDING (BHMG ONLY) (OUTPATIENT)
Dept: OBSTETRICS AND GYNECOLOGY | Facility: CLINIC | Age: 37
End: 2022-06-01

## 2022-06-01 LAB
PROLACTIN SERPL-MCNC: 10.6 NG/ML (ref 4.8–23.3)
T3 SERPL-MCNC: 89 NG/DL (ref 71–180)
T4 FREE SERPL-MCNC: 1.12 NG/DL (ref 0.82–1.77)
THYROPEROXIDASE AB SERPL-ACNC: <8 IU/ML (ref 0–34)

## 2022-06-01 NOTE — PROGRESS NOTES
A MY-CHART MESSAGE HAS BEEN SENT TO THE PATIENT FOR PATIENT ROUNDING WITH [facility]

## 2022-06-02 LAB
CYTOLOGIST CVX/VAG CYTO: NORMAL
CYTOLOGY CVX/VAG DOC CYTO: NORMAL
CYTOLOGY CVX/VAG DOC THIN PREP: NORMAL
DX ICD CODE: NORMAL
HIV 1 & 2 AB SER-IMP: NORMAL
HPV I/H RISK 4 DNA CVX QL PROBE+SIG AMP: NEGATIVE
OTHER STN SPEC: NORMAL
STAT OF ADQ CVX/VAG CYTO-IMP: NORMAL

## 2022-06-06 RX ORDER — NORETHINDRONE ACETATE AND ETHINYL ESTRADIOL, ETHINYL ESTRADIOL AND FERROUS FUMARATE 1MG-10(24)
1 KIT ORAL DAILY
Qty: 84 TABLET | Refills: 1 | Status: SHIPPED | OUTPATIENT
Start: 2022-06-06 | End: 2023-01-16 | Stop reason: SINTOL

## 2022-11-21 RX ORDER — OSELTAMIVIR PHOSPHATE 75 MG/1
75 CAPSULE ORAL 2 TIMES DAILY
Qty: 10 CAPSULE | Refills: 0 | Status: SHIPPED | OUTPATIENT
Start: 2022-11-21 | End: 2022-11-26

## 2023-01-16 ENCOUNTER — OFFICE VISIT (OUTPATIENT)
Dept: INTERNAL MEDICINE | Facility: CLINIC | Age: 38
End: 2023-01-16
Payer: COMMERCIAL

## 2023-01-16 VITALS
DIASTOLIC BLOOD PRESSURE: 78 MMHG | SYSTOLIC BLOOD PRESSURE: 118 MMHG | HEART RATE: 77 BPM | HEIGHT: 71 IN | WEIGHT: 162 LBS | OXYGEN SATURATION: 100 % | TEMPERATURE: 98 F | BODY MASS INDEX: 22.68 KG/M2

## 2023-01-16 DIAGNOSIS — Z00.00 ENCOUNTER FOR WELLNESS EXAMINATION IN ADULT: Primary | ICD-10-CM

## 2023-01-16 PROCEDURE — 99395 PREV VISIT EST AGE 18-39: CPT | Performed by: NURSE PRACTITIONER

## 2023-01-16 NOTE — PATIENT INSTRUCTIONS
Mental Health and Counseling Services:    [facility] or [facility] Direct - [email or web address]    [facility]:  [phone]; [email or web address]    [facility]: [phone]    [facility]:  [phone]    [facility]arm[facility] Lalo:  [phone]    Jenelle Nick:  [phone]    [facility] Gerard:  [phone]    [facility]:  [phone]    [facility]:  [phone]    [facility], [phone]    [facility] [phone]    [facility]:  [phone]    [phone]    [phone]    [facility]:  [phone]    [facility]:  [phone]    [facility]:  [phone]    [phone]    [phone]    [phone]

## 2023-01-16 NOTE — PROGRESS NOTES
"MITCH Worthington is a 37 y.o. female presenting for Annual Exam    Her current/chronic health conditions include:  Patient Active Problem List   Diagnosis   • Constipation       Outpatient Medications Marked as Taking for the 1/16/23 encounter (Office Visit) with Laura Barclay APRN   Medication Sig Dispense Refill   • Ascorbic Acid (VITAMIN C PO) Take 1 capsule by mouth Daily.     • MELATONIN PO Take  by mouth.     • Multiple Vitamin (MULTIVITAMIN PO) Take 1 tablet by mouth Daily.     • VITAMIN D PO Take 1 capsule by mouth Daily.         Started back to school last week. Early childhood.      Health Habits:  Nutrition: high protein and veg; few carbs  Exercise: 5 times/week. Walking    Screenings:  PAP: UTD per GYN  Mammogram: n/a  Dexa: n/a  Colon Cancer: CLS 03/2022, one polyp, 10yr recall  Tob use: former       Complaints today include:      The following portions of the patient's history were reviewed and updated as appropriate: allergies, current medications, problem list, past medical history, past family history, and past social history.    Review of Systems   Constitutional: Negative.    HENT: Negative.    Eyes: Negative.    Respiratory: Negative.    Cardiovascular: Negative.    Gastrointestinal: Negative.    Endocrine: Negative.    Genitourinary: Negative.    Musculoskeletal: Negative.    Skin: Negative.    Allergic/Immunologic: Negative.    Neurological: Negative.    Hematological: Negative.    Psychiatric/Behavioral: Negative.        OBJECTIVE    Vitals:    01/16/23 1127   BP: 118/78   Pulse: 77   Temp: 98 °F (36.7 °C)   SpO2: 100%   Weight: 73.5 kg (162 lb)   Height: 180.3 cm (70.98\")       BP Readings from Last 3 Encounters:   01/16/23 118/78   05/31/22 110/88   03/31/22 108/74        Wt Readings from Last 3 Encounters:   01/16/23 73.5 kg (162 lb)   05/31/22 71.7 kg (158 lb)   03/31/22 70.3 kg (155 lb)        Body mass index is 22.6 kg/m².  Nursing notes and vital signs " reviewed.      Physical Exam  Constitutional:       General: She is not in acute distress.     Appearance: Normal appearance. She is well-developed.   HENT:      Head: Normocephalic.      Right Ear: Hearing, tympanic membrane, ear canal and external ear normal.      Left Ear: Hearing, tympanic membrane, ear canal and external ear normal.      Nose: Nose normal. No mucosal edema or rhinorrhea.      Mouth/Throat:      Mouth: Mucous membranes are moist.      Pharynx: Oropharynx is clear. Uvula midline.   Eyes:      General: Lids are normal.      Extraocular Movements: Extraocular movements intact.      Conjunctiva/sclera: Conjunctivae normal.      Pupils: Pupils are equal, round, and reactive to light.   Neck:      Thyroid: No thyroid mass or thyromegaly.   Cardiovascular:      Rate and Rhythm: Regular rhythm.      Pulses: Normal pulses.      Heart sounds: S1 normal and S2 normal. No murmur heard.    No friction rub. No gallop.   Pulmonary:      Effort: Pulmonary effort is normal.      Breath sounds: Normal breath sounds. No wheezing, rhonchi or rales.   Abdominal:      General: Bowel sounds are normal.      Palpations: Abdomen is soft.      Tenderness: There is no abdominal tenderness. There is no guarding.      Hernia: No hernia is present.   Musculoskeletal:         General: No deformity. Normal range of motion.      Cervical back: Normal range of motion and neck supple.   Lymphadenopathy:      Cervical: No cervical adenopathy.   Skin:     General: Skin is warm and dry.      Findings: No lesion or rash.   Neurological:      General: No focal deficit present.      Mental Status: She is alert and oriented to person, place, and time.      Cranial Nerves: No cranial nerve deficit.      Sensory: No sensory deficit.      Motor: Motor function is intact.      Coordination: Coordination is intact.      Gait: Gait normal.      Deep Tendon Reflexes: Reflexes are normal and symmetric.   Psychiatric:         Attention and  Perception: She is attentive.         Mood and Affect: Mood and affect normal.         Speech: Speech normal.         Behavior: Behavior normal.         Thought Content: Thought content normal.         No results found for this or any previous visit (from the past 672 hour(s)).      ASSESSMENT AND PLAN    Diagnoses and all orders for this visit:    1. Encounter for wellness examination in adult (Primary)  -     CBC (No Diff)  -     Comprehensive Metabolic Panel  -     Lipid Panel With / Chol / HDL Ratio  -     TSH        Preventative counseling completed including relevant screenings, appropriate vaccinations, healthy nutrition, and appropriate physical activity. Age-appropriate Screening & Interventions recommended by USPSTF were reviewed with the patient, and Health Maintenance was updated in Epic.  BMI is within normal parameters. No other follow-up for BMI required.          Medications, including side effects, were discussed with the patient. Patient verbalized understanding.  The plan of care was discussed. All questions were answered. Patient verbalized understanding.        Return in about 1 year (around 1/16/2024) for fasting labs one week prior to, Annual physical., or sooner if needed.

## 2023-01-17 LAB
ALBUMIN SERPL-MCNC: 4.9 G/DL (ref 3.5–5.2)
ALBUMIN/GLOB SERPL: 2.7 G/DL
ALP SERPL-CCNC: 39 U/L (ref 39–117)
ALT SERPL-CCNC: 28 U/L (ref 1–33)
AST SERPL-CCNC: 31 U/L (ref 1–32)
BILIRUB SERPL-MCNC: 0.4 MG/DL (ref 0–1.2)
BUN SERPL-MCNC: 8 MG/DL (ref 6–20)
BUN/CREAT SERPL: 13.1 (ref 7–25)
CALCIUM SERPL-MCNC: 9.5 MG/DL (ref 8.6–10.5)
CHLORIDE SERPL-SCNC: 101 MMOL/L (ref 98–107)
CHOLEST SERPL-MCNC: 170 MG/DL (ref 0–200)
CHOLEST/HDLC SERPL: 2.13 {RATIO}
CO2 SERPL-SCNC: 23.6 MMOL/L (ref 22–29)
CREAT SERPL-MCNC: 0.61 MG/DL (ref 0.57–1)
EGFRCR SERPLBLD CKD-EPI 2021: 119.7 ML/MIN/1.73
ERYTHROCYTE [DISTWIDTH] IN BLOOD BY AUTOMATED COUNT: 12.1 % (ref 12.3–15.4)
GLOBULIN SER CALC-MCNC: 1.8 GM/DL
GLUCOSE SERPL-MCNC: 101 MG/DL (ref 65–99)
HCT VFR BLD AUTO: 40.7 % (ref 34–46.6)
HDLC SERPL-MCNC: 80 MG/DL (ref 40–60)
HGB BLD-MCNC: 14.1 G/DL (ref 12–15.9)
LDLC SERPL CALC-MCNC: 80 MG/DL (ref 0–100)
MCH RBC QN AUTO: 33.3 PG (ref 26.6–33)
MCHC RBC AUTO-ENTMCNC: 34.6 G/DL (ref 31.5–35.7)
MCV RBC AUTO: 96.2 FL (ref 79–97)
PLATELET # BLD AUTO: 202 10*3/MM3 (ref 140–450)
POTASSIUM SERPL-SCNC: 4.4 MMOL/L (ref 3.5–5.2)
PROT SERPL-MCNC: 6.7 G/DL (ref 6–8.5)
RBC # BLD AUTO: 4.23 10*6/MM3 (ref 3.77–5.28)
SODIUM SERPL-SCNC: 137 MMOL/L (ref 136–145)
TRIGL SERPL-MCNC: 45 MG/DL (ref 0–150)
TSH SERPL DL<=0.005 MIU/L-ACNC: 1.36 UIU/ML (ref 0.27–4.2)
VLDLC SERPL CALC-MCNC: 10 MG/DL (ref 5–40)
WBC # BLD AUTO: 5.06 10*3/MM3 (ref 3.4–10.8)

## 2023-04-17 ENCOUNTER — OFFICE VISIT (OUTPATIENT)
Dept: INTERNAL MEDICINE | Facility: CLINIC | Age: 38
End: 2023-04-17
Payer: COMMERCIAL

## 2023-04-17 VITALS
TEMPERATURE: 98.4 F | RESPIRATION RATE: 18 BRPM | HEART RATE: 81 BPM | DIASTOLIC BLOOD PRESSURE: 84 MMHG | HEIGHT: 71 IN | BODY MASS INDEX: 22.9 KG/M2 | OXYGEN SATURATION: 98 % | SYSTOLIC BLOOD PRESSURE: 128 MMHG | WEIGHT: 163.6 LBS

## 2023-04-17 DIAGNOSIS — J98.8 VIRAL RESPIRATORY ILLNESS: Primary | ICD-10-CM

## 2023-04-17 DIAGNOSIS — B97.89 VIRAL RESPIRATORY ILLNESS: Primary | ICD-10-CM

## 2023-04-17 LAB
EXPIRATION DATE: NORMAL
EXPIRATION DATE: NORMAL
INTERNAL CONTROL: NORMAL
INTERNAL CONTROL: NORMAL
Lab: NORMAL
Lab: NORMAL
S PYO AG THROAT QL: NEGATIVE
SARS-COV-2 AG UPPER RESP QL IA.RAPID: NOT DETECTED

## 2023-04-17 PROCEDURE — 87426 SARSCOV CORONAVIRUS AG IA: CPT | Performed by: NURSE PRACTITIONER

## 2023-04-17 PROCEDURE — 87880 STREP A ASSAY W/OPTIC: CPT | Performed by: NURSE PRACTITIONER

## 2023-04-17 PROCEDURE — 99213 OFFICE O/P EST LOW 20 MIN: CPT | Performed by: NURSE PRACTITIONER

## 2023-04-17 NOTE — PROGRESS NOTES
"Elin Quiñonez is a 37 y.o. female presenting today for   Chief Complaint   Patient presents with   • Sore Throat     Started this morning    • Nasal Congestion     Started yesterday. Denies any nausea, vomiting, diarrhea, fever, body aches or chills        Subjective      URI   This is a new problem. The current episode started yesterday. The problem has been gradually worsening. There has been no fever. Associated symptoms include congestion, coughing, ear pain (bilat), rhinorrhea and a sore throat. Pertinent negatives include no diarrhea, vomiting or wheezing. Treatments tried: dayquil. The treatment provided moderate relief.    worker. Multiple exposures to numerous viruses and bacteria.      The following portions of the patient's history were reviewed and updated as appropriate: allergies, current medications, problem list, past medical history, past surgical history, family history, and social history.    Review of Systems   Constitutional: Negative for fever.   HENT: Positive for congestion, ear pain (bilat), postnasal drip, rhinorrhea and sore throat.    Respiratory: Positive for cough. Negative for shortness of breath and wheezing.    Gastrointestinal: Negative for diarrhea and vomiting.   Neurological: Negative for dizziness and headache.         Objective    Vitals:    04/17/23 1111   BP: 128/84   BP Location: Left arm   Patient Position: Sitting   Cuff Size: Adult   Pulse: 81   Resp: 18   Temp: 98.4 °F (36.9 °C)   TempSrc: Oral   SpO2: 98%   Weight: 74.2 kg (163 lb 9.6 oz)   Height: 180.3 cm (70.98\")     Body mass index is 22.83 kg/m².  Nursing notes and vitals reviewed.    Physical Exam  Constitutional:       General: She is not in acute distress.     Appearance: She is well-developed.   HENT:      Head: Normocephalic.      Right Ear: Hearing, tympanic membrane, ear canal and external ear normal.      Left Ear: Hearing, tympanic membrane, ear canal and external ear normal.      Nose: Mucosal " edema and rhinorrhea present. Rhinorrhea is clear.      Right Turbinates: Swollen.      Left Turbinates: Swollen.      Mouth/Throat:      Mouth: Mucous membranes are moist.      Pharynx: Oropharynx is clear. Uvula midline.   Neck:      Thyroid: No thyroid mass or thyromegaly.   Cardiovascular:      Rate and Rhythm: Regular rhythm.      Pulses: Normal pulses.      Heart sounds: S1 normal and S2 normal. No murmur heard.    No friction rub. No gallop.   Pulmonary:      Effort: Pulmonary effort is normal.      Breath sounds: Normal breath sounds. No wheezing, rhonchi or rales.   Musculoskeletal:      Cervical back: Neck supple.   Lymphadenopathy:      Cervical: No cervical adenopathy.   Neurological:      Mental Status: She is alert and oriented to person, place, and time.      Cranial Nerves: No cranial nerve deficit.      Sensory: No sensory deficit.   Psychiatric:         Attention and Perception: She is attentive.         Speech: Speech normal.         Behavior: Behavior normal.         Recent Results (from the past 24 hour(s))   POCT rapid strep A    Collection Time: 04/17/23 11:25 AM    Specimen: Swab   Result Value Ref Range    Rapid Strep A Screen Negative Negative, VALID, INVALID, Not Performed    Internal Control Passed Passed    Lot Number [id number]     Expiration Date 10/31/2024    POCT SARS-CoV-2 Antigen ANNA    Collection Time: 04/17/23 11:35 AM    Specimen: Swab   Result Value Ref Range    SARS Antigen Not Detected Not Detected, Presumptive Negative    Internal Control Passed Passed    Lot Number [id number]     Expiration Date 08/23/23        Assessment and Plan    Diagnoses and all orders for this visit:    1. Viral respiratory illness (Primary)  -     POCT rapid strep A  -     POCT SARS-CoV-2 Antigen ANNA        Discussed supportive care and emergent S&S.  - Mucinex  - Flonase  - Nyquil/Dayquil      Medications, including side effects, were discussed with the patient. Patient verbalized understanding.  The  plan of care was discussed. All questions were answered. Patient verbalized understanding.        Return if symptoms worsen or fail to improve.

## 2023-04-28 ENCOUNTER — OFFICE VISIT (OUTPATIENT)
Dept: INTERNAL MEDICINE | Facility: CLINIC | Age: 38
End: 2023-04-28
Payer: COMMERCIAL

## 2023-04-28 VITALS
WEIGHT: 163 LBS | HEIGHT: 71 IN | BODY MASS INDEX: 22.82 KG/M2 | OXYGEN SATURATION: 99 % | DIASTOLIC BLOOD PRESSURE: 80 MMHG | HEART RATE: 86 BPM | RESPIRATION RATE: 16 BRPM | SYSTOLIC BLOOD PRESSURE: 122 MMHG

## 2023-04-28 DIAGNOSIS — K59.09 INTERMITTENT CONSTIPATION: ICD-10-CM

## 2023-04-28 DIAGNOSIS — K60.2 ANAL FISSURE: Primary | ICD-10-CM

## 2023-04-28 PROCEDURE — 99213 OFFICE O/P EST LOW 20 MIN: CPT | Performed by: NURSE PRACTITIONER

## 2023-04-28 NOTE — PROGRESS NOTES
"Kelin Quiñonez is a 37 y.o. female presenting today for follow up of   Chief Complaint   Patient presents with   • Rectal Bleeding       History of Present Illness     Outpatient Medications Marked as Taking for the 4/28/23 encounter (Office Visit) with Laura Barclay APRN   Medication Sig Dispense Refill   • Ascorbic Acid (VITAMIN C PO) Take 1 capsule by mouth Daily.     • Multiple Vitamin (MULTIVITAMIN PO) Take 1 tablet by mouth Daily.     • VITAMIN D PO Take 1 capsule by mouth Daily.       [facility] 03/31/2022  Impression:  - Anal fissure found on perianal exam.  - The examined portion of the ileum was normal.  - One 3 mm polyp at the recto-sigmoid colon, removed with a cold biopsy forceps. Resected and retrieved.  - The examination was otherwise normal.    Taking QD stool softener and fiber. Still w/ occas constipation. Rare BRRB, once every 1-2mo. A few drops and then resolves.    The following portions of the patient's history were reviewed and updated as appropriate: allergies, current medications, past family history, past medical history, past social history, past surgical history and problem list.        Objective   Vitals:    04/28/23 0946   BP: 122/80   Pulse: 86   Resp: 16   SpO2: 99%   Weight: 73.9 kg (163 lb)   Height: 180.3 cm (71\")       BP Readings from Last 3 Encounters:   04/28/23 122/80   04/17/23 128/84   01/16/23 118/78        Wt Readings from Last 3 Encounters:   04/28/23 73.9 kg (163 lb)   04/17/23 74.2 kg (163 lb 9.6 oz)   01/16/23 73.5 kg (162 lb)        Body mass index is 22.73 kg/m².  Nursing notes and vitals reviewed.    Physical Exam  Constitutional:       General: She is not in acute distress.     Appearance: She is well-developed.   Pulmonary:      Effort: Pulmonary effort is normal.   Neurological:      Mental Status: She is alert.   Psychiatric:         Attention and Perception: She is attentive.         Speech: Speech normal.             Assessment & Plan "   Diagnoses and all orders for this visit:    1. Anal fissure (Primary)   - sitz bath   - wet towelettes    2. Intermittent constipation   - cont fiber   - cont stool softener   - add nightly Mg supp          Medications, including side effects, were discussed with the patient. Patient verbalized understanding.  The plan of care was discussed. All questions were answered. Patient verbalized understanding.      Return if symptoms worsen or fail to improve.

## 2023-06-14 ENCOUNTER — OFFICE VISIT (OUTPATIENT)
Dept: GASTROENTEROLOGY | Facility: CLINIC | Age: 38
End: 2023-06-14
Payer: COMMERCIAL

## 2023-06-14 VITALS
DIASTOLIC BLOOD PRESSURE: 86 MMHG | TEMPERATURE: 98.2 F | HEIGHT: 71 IN | SYSTOLIC BLOOD PRESSURE: 135 MMHG | OXYGEN SATURATION: 98 % | HEART RATE: 80 BPM | WEIGHT: 164.4 LBS | BODY MASS INDEX: 23.02 KG/M2

## 2023-06-14 DIAGNOSIS — K62.5 BRBPR (BRIGHT RED BLOOD PER RECTUM): ICD-10-CM

## 2023-06-14 DIAGNOSIS — K59.04 CHRONIC IDIOPATHIC CONSTIPATION: Primary | ICD-10-CM

## 2023-06-14 NOTE — PROGRESS NOTES
"Chief Complaint  Black or Bloody Stool    Subjective          History of Present Illness    Elin Quiñonez is a  38 y.o. female presents for follow-up on constipation and intermittent rectal bleeding.  She is a patient of Dr. Mancilla last seen for colonoscopy on 4/25/2022.  She is new to me.    She presented with sensation of constipation with incomplete defecation and intermittent bright red rectal bleeding ongoing for about 7 years.  She is taking stool softener daily and fiber.  Still with occasional constipation. Added in magnesium whicih has helped her stools. Now loose stools, 1 per day, no urgency. She anal fissure on perianal exam for colonoscopy last year.  She takes stool softener daily and fiber.      She does have intermittent episodes of bright red blood dripping in the toilet bowl.  Will occur once every couple months.  Occurs more often after heavy alcohol use the night before.  Also occurred with episode of diarrhea.     3/31/2022 colonoscopy showed anal fissure on perianal exam, one hyperplastic 3 mm polyp.  Recall age 47.  She denies any family history of colon cancer or polyps.    Objective   Vital Signs:   /86 (BP Location: Right arm, Patient Position: Sitting, Cuff Size: Adult)   Pulse 80   Temp 98.2 °F (36.8 °C) (Temporal)   Ht 180.3 cm (71\")   Wt 74.6 kg (164 lb 6.4 oz)   SpO2 98%   BMI 22.93 kg/m²       Physical Exam  Vitals reviewed.   Constitutional:       General: She is awake. She is not in acute distress.     Appearance: Normal appearance. She is well-developed and well-groomed.   HENT:      Head: Normocephalic.   Pulmonary:      Effort: Pulmonary effort is normal. No respiratory distress.   Abdominal:      Comments: Rectal exam: No external masses or lesions of the anus or perineum.  Small denuded, uninflamed external hemorrhoid at the 6 o'clock position.  No evidence of active fissure.  No evidence of bleeding externally.  Normal sphincter tone.  No internal masses " palpated.  Stool is normal in appearance.      Skin:     Coloration: Skin is not pale.   Neurological:      Mental Status: She is alert and oriented to person, place, and time.      Gait: Gait is intact.   Psychiatric:         Mood and Affect: Mood and affect normal.         Speech: Speech normal.         Behavior: Behavior is cooperative.         Judgment: Judgment normal.        Result Review :             Assessment and Plan    Diagnoses and all orders for this visit:    1. Chronic idiopathic constipation (Primary)    2. BRBPR (bright red blood per rectum)    Her constipation is better controlled with stool softeners and magnesium daily.  Continue this.    She does have intermittent episodes of bright red blood dripping in the toilet bowl.  Will occur once every couple months.  Occurs more often after heavy alcohol use the night before.  Also occurred with episode of diarrhea.  Rectal exam unremarkable.  Suspect internal hemorrhoids which we discussed.  No evidence of anal or rectal mass.  Colonoscopy just over 1 year ago showed 1 hyperplastic polyp.  Would recommend treating persistent episodes with hemorrhoid suppositories.  If bleeding is more frequent or persists over several days, recommend she return to office to reevaluate.  No need for repeat colonoscopy at this time.      Follow Up   Return if symptoms worsen or fail to improve.    Dragon dictation used throughout this note.     Marla Encarnacion PA-C

## 2024-01-03 ENCOUNTER — TELEPHONE (OUTPATIENT)
Dept: INTERNAL MEDICINE | Facility: CLINIC | Age: 39
End: 2024-01-03
Payer: COMMERCIAL

## 2024-01-08 DIAGNOSIS — Z00.00 ROUTINE HEALTH MAINTENANCE: Primary | ICD-10-CM

## 2024-01-09 LAB
ALBUMIN SERPL-MCNC: 4.9 G/DL (ref 3.9–4.9)
ALBUMIN/GLOB SERPL: 2 {RATIO} (ref 1.2–2.2)
ALP SERPL-CCNC: 40 IU/L (ref 44–121)
ALT SERPL-CCNC: 26 IU/L (ref 0–32)
AST SERPL-CCNC: 38 IU/L (ref 0–40)
BILIRUB SERPL-MCNC: 0.5 MG/DL (ref 0–1.2)
BUN SERPL-MCNC: 14 MG/DL (ref 6–20)
BUN/CREAT SERPL: 19 (ref 9–23)
CALCIUM SERPL-MCNC: 9.7 MG/DL (ref 8.7–10.2)
CHLORIDE SERPL-SCNC: 100 MMOL/L (ref 96–106)
CHOLEST SERPL-MCNC: 224 MG/DL (ref 100–199)
CHOLEST/HDLC SERPL: 2.9 RATIO (ref 0–4.4)
CO2 SERPL-SCNC: 20 MMOL/L (ref 20–29)
CREAT SERPL-MCNC: 0.75 MG/DL (ref 0.57–1)
EGFRCR SERPLBLD CKD-EPI 2021: 106 ML/MIN/1.73
ERYTHROCYTE [DISTWIDTH] IN BLOOD BY AUTOMATED COUNT: 12.4 % (ref 11.7–15.4)
GLOBULIN SER CALC-MCNC: 2.4 G/DL (ref 1.5–4.5)
GLUCOSE SERPL-MCNC: 96 MG/DL (ref 70–99)
HCT VFR BLD AUTO: 43.8 % (ref 34–46.6)
HDLC SERPL-MCNC: 78 MG/DL
HGB BLD-MCNC: 14.5 G/DL (ref 11.1–15.9)
LDLC SERPL CALC-MCNC: 132 MG/DL (ref 0–99)
MCH RBC QN AUTO: 32.2 PG (ref 26.6–33)
MCHC RBC AUTO-ENTMCNC: 33.1 G/DL (ref 31.5–35.7)
MCV RBC AUTO: 97 FL (ref 79–97)
PLATELET # BLD AUTO: 219 X10E3/UL (ref 150–450)
POTASSIUM SERPL-SCNC: 5 MMOL/L (ref 3.5–5.2)
PROT SERPL-MCNC: 7.3 G/DL (ref 6–8.5)
RBC # BLD AUTO: 4.51 X10E6/UL (ref 3.77–5.28)
SODIUM SERPL-SCNC: 138 MMOL/L (ref 134–144)
TRIGL SERPL-MCNC: 78 MG/DL (ref 0–149)
TSH SERPL DL<=0.005 MIU/L-ACNC: 2.11 UIU/ML (ref 0.45–4.5)
VLDLC SERPL CALC-MCNC: 14 MG/DL (ref 5–40)
WBC # BLD AUTO: 4.4 X10E3/UL (ref 3.4–10.8)

## 2024-01-18 ENCOUNTER — TELEPHONE (OUTPATIENT)
Dept: OBSTETRICS AND GYNECOLOGY | Facility: CLINIC | Age: 39
End: 2024-01-18

## 2024-01-18 ENCOUNTER — OFFICE VISIT (OUTPATIENT)
Dept: INTERNAL MEDICINE | Facility: CLINIC | Age: 39
End: 2024-01-18
Payer: COMMERCIAL

## 2024-01-18 VITALS
WEIGHT: 165.6 LBS | BODY MASS INDEX: 23.18 KG/M2 | HEIGHT: 71 IN | TEMPERATURE: 98 F | DIASTOLIC BLOOD PRESSURE: 96 MMHG | HEART RATE: 92 BPM | OXYGEN SATURATION: 98 % | SYSTOLIC BLOOD PRESSURE: 142 MMHG

## 2024-01-18 DIAGNOSIS — U07.1 COVID-19: Primary | ICD-10-CM

## 2024-01-18 LAB
EXPIRATION DATE: ABNORMAL
EXPIRATION DATE: NORMAL
EXPIRATION DATE: NORMAL
FLUAV AG NPH QL: NEGATIVE
FLUBV AG NPH QL: NEGATIVE
INTERNAL CONTROL: ABNORMAL
INTERNAL CONTROL: NORMAL
INTERNAL CONTROL: NORMAL
Lab: ABNORMAL
Lab: NORMAL
Lab: NORMAL
S PYO AG THROAT QL: NEGATIVE
SARS-COV-2 AG UPPER RESP QL IA.RAPID: DETECTED

## 2024-01-18 NOTE — PROGRESS NOTES
"Elin Quiñonez is a 38 y.o. female presenting today for   Chief Complaint   Patient presents with    Nasal Congestion     Symptoms started yesterday. Not sure if she has had a fever but has felt feverish.     Sore Throat    Generalized Body Aches       Subjective    Onset: yesterday  Timing: constant  Progression: unchanged  Quality: n/a  Associated Signs and Symptoms: chills, body aches, congestion, ST  Aggravating factors: n/a  Alleviating factors: n/a  OTCs/Home Remedies: claritin d, dayquil      The following portions of the patient's history were reviewed and updated as appropriate: allergies, current medications, problem list, past medical history, past surgical history, family history, and social history.    Review of Systems   Constitutional:  Positive for chills. Negative for fever.   HENT:  Positive for congestion, rhinorrhea and sore throat.    Respiratory:  Positive for shortness of breath (mild). Negative for cough.    Cardiovascular:  Negative for chest pain.   Gastrointestinal:  Negative for diarrhea, nausea and vomiting.   Neurological:  Positive for headache.         Objective    Vitals:    01/18/24 0809   BP: 142/96   BP Location: Left arm   Patient Position: Sitting   Cuff Size: Adult   Pulse: 92   Temp: 98 °F (36.7 °C)   TempSrc: Infrared   SpO2: 98%   Weight: 75.1 kg (165 lb 9.6 oz)   Height: 180.3 cm (71\")     Body mass index is 23.1 kg/m².  Nursing notes and vitals reviewed.    Physical Exam  Constitutional:       General: She is not in acute distress.     Appearance: She is well-developed.   HENT:      Right Ear: Hearing, tympanic membrane, ear canal and external ear normal.      Left Ear: Hearing, tympanic membrane, ear canal and external ear normal.      Nose: Nose normal.      Mouth/Throat:      Mouth: Mucous membranes are moist.      Pharynx: Oropharynx is clear. Uvula midline.   Neck:      Thyroid: No thyroid mass or thyromegaly.   Cardiovascular:      Rate and Rhythm: Regular rhythm. "      Pulses: Normal pulses.      Heart sounds: S1 normal and S2 normal. No murmur heard.     No friction rub. No gallop.   Pulmonary:      Effort: Pulmonary effort is normal.      Breath sounds: Normal breath sounds. No wheezing, rhonchi or rales.   Musculoskeletal:      Cervical back: Neck supple.   Lymphadenopathy:      Cervical: No cervical adenopathy.   Neurological:      Mental Status: She is alert and oriented to person, place, and time.      Cranial Nerves: No cranial nerve deficit.      Sensory: No sensory deficit.   Psychiatric:         Attention and Perception: She is attentive.         Speech: Speech normal.         Behavior: Behavior normal.         Recent Results (from the past 24 hour(s))   POCT rapid strep A    Collection Time: 01/18/24  8:25 AM    Specimen: Swab   Result Value Ref Range    Rapid Strep A Screen Negative Negative, VALID, INVALID, Not Performed    Internal Control Passed Passed    Lot Number [id number]     Expiration Date 1/1/25    POCT VERITOR SARS-CoV-2 Antigen    Collection Time: 01/18/24  8:25 AM    Specimen: Nasopharynx; Swab   Result Value Ref Range    SARS Antigen Detected (A) Not Detected, Presumptive Negative    Internal Control Passed Passed    Lot Number [id number]     Expiration Date 6/11/24        Assessment and Plan    Diagnoses and all orders for this visit:    1. COVID-19 (Primary)  -     POCT VERITOR SARS-CoV-2 Antigen  -     POCT Influenza A/B  -     POCT rapid strep A      Anticipatory guidance. Discussed supportive care and emergent S&S.  - discussed Paxlovid. Pt declined.  - Ibuprofen/tylenol  - Mucinex D      Medications, including side effects, were discussed with the patient. Patient verbalized understanding.  The plan of care was discussed. All questions were answered. Patient verbalized understanding.        Return in about 2 weeks (around 2/1/2024) for Annual physical.

## 2024-02-12 ENCOUNTER — OFFICE VISIT (OUTPATIENT)
Dept: OBSTETRICS AND GYNECOLOGY | Facility: CLINIC | Age: 39
End: 2024-02-12
Payer: COMMERCIAL

## 2024-02-12 ENCOUNTER — OFFICE VISIT (OUTPATIENT)
Dept: INTERNAL MEDICINE | Facility: CLINIC | Age: 39
End: 2024-02-12
Payer: COMMERCIAL

## 2024-02-12 VITALS
TEMPERATURE: 98.4 F | OXYGEN SATURATION: 100 % | SYSTOLIC BLOOD PRESSURE: 124 MMHG | HEIGHT: 71 IN | DIASTOLIC BLOOD PRESSURE: 82 MMHG | WEIGHT: 167.2 LBS | BODY MASS INDEX: 23.41 KG/M2 | HEART RATE: 76 BPM

## 2024-02-12 VITALS
BODY MASS INDEX: 23.41 KG/M2 | SYSTOLIC BLOOD PRESSURE: 128 MMHG | WEIGHT: 167.2 LBS | HEIGHT: 71 IN | DIASTOLIC BLOOD PRESSURE: 76 MMHG

## 2024-02-12 DIAGNOSIS — E78.00 PURE HYPERCHOLESTEROLEMIA: ICD-10-CM

## 2024-02-12 DIAGNOSIS — Z91.89 RELIES ON PARTNER VASECTOMY FOR CONTRACEPTION: ICD-10-CM

## 2024-02-12 DIAGNOSIS — Z11.51 SCREENING FOR HUMAN PAPILLOMAVIRUS (HPV): ICD-10-CM

## 2024-02-12 DIAGNOSIS — Z01.419 ROUTINE GYNECOLOGICAL EXAMINATION: Primary | ICD-10-CM

## 2024-02-12 DIAGNOSIS — Z00.00 ENCOUNTER FOR WELLNESS EXAMINATION IN ADULT: Primary | ICD-10-CM

## 2024-02-12 LAB
B-HCG UR QL: NEGATIVE
BILIRUB BLD-MCNC: NEGATIVE MG/DL
CLARITY, POC: CLEAR
COLOR UR: YELLOW
EXPIRATION DATE: NORMAL
GLUCOSE UR STRIP-MCNC: NEGATIVE MG/DL
INTERNAL NEGATIVE CONTROL: NEGATIVE
INTERNAL POSITIVE CONTROL: POSITIVE
KETONES UR QL: NEGATIVE
LEUKOCYTE EST, POC: NEGATIVE
Lab: 55
NITRITE UR-MCNC: NEGATIVE MG/ML
PH UR: 5 [PH] (ref 5–8)
PROT UR STRIP-MCNC: NEGATIVE MG/DL
RBC # UR STRIP: NEGATIVE /UL
SP GR UR: 1 (ref 1–1.03)
UROBILINOGEN UR QL: NORMAL

## 2024-02-12 PROCEDURE — 81025 URINE PREGNANCY TEST: CPT | Performed by: NURSE PRACTITIONER

## 2024-02-12 PROCEDURE — 81002 URINALYSIS NONAUTO W/O SCOPE: CPT | Performed by: NURSE PRACTITIONER

## 2024-02-12 PROCEDURE — 99395 PREV VISIT EST AGE 18-39: CPT | Performed by: NURSE PRACTITIONER

## 2024-07-31 DIAGNOSIS — E78.00 PURE HYPERCHOLESTEROLEMIA: ICD-10-CM

## 2024-08-06 LAB
ALBUMIN SERPL-MCNC: 4.8 G/DL (ref 3.5–5.2)
ALBUMIN/GLOB SERPL: 2.4 G/DL
ALP SERPL-CCNC: 38 U/L (ref 39–117)
ALT SERPL-CCNC: 27 U/L (ref 1–33)
AST SERPL-CCNC: 30 U/L (ref 1–32)
BILIRUB SERPL-MCNC: 0.4 MG/DL (ref 0–1.2)
BUN SERPL-MCNC: 9 MG/DL (ref 6–20)
BUN/CREAT SERPL: 12.3 (ref 7–25)
CALCIUM SERPL-MCNC: 9.6 MG/DL (ref 8.6–10.5)
CHLORIDE SERPL-SCNC: 99 MMOL/L (ref 98–107)
CHOLEST SERPL-MCNC: 195 MG/DL (ref 0–200)
CHOLEST/HDLC SERPL: 2.64 {RATIO}
CO2 SERPL-SCNC: 22.5 MMOL/L (ref 22–29)
CREAT SERPL-MCNC: 0.73 MG/DL (ref 0.57–1)
EGFRCR SERPLBLD CKD-EPI 2021: 108.8 ML/MIN/1.73
GLOBULIN SER CALC-MCNC: 2 GM/DL
GLUCOSE SERPL-MCNC: 103 MG/DL (ref 65–99)
HDLC SERPL-MCNC: 74 MG/DL (ref 40–60)
LDLC SERPL CALC-MCNC: 103 MG/DL (ref 0–100)
POTASSIUM SERPL-SCNC: 4.4 MMOL/L (ref 3.5–5.2)
PROT SERPL-MCNC: 6.8 G/DL (ref 6–8.5)
SODIUM SERPL-SCNC: 138 MMOL/L (ref 136–145)
TRIGL SERPL-MCNC: 105 MG/DL (ref 0–150)
VLDLC SERPL CALC-MCNC: 18 MG/DL (ref 5–40)

## 2024-08-13 ENCOUNTER — OFFICE VISIT (OUTPATIENT)
Dept: INTERNAL MEDICINE | Facility: CLINIC | Age: 39
End: 2024-08-13
Payer: COMMERCIAL

## 2024-08-13 VITALS
SYSTOLIC BLOOD PRESSURE: 142 MMHG | BODY MASS INDEX: 23.83 KG/M2 | OXYGEN SATURATION: 100 % | WEIGHT: 170.2 LBS | TEMPERATURE: 98.3 F | DIASTOLIC BLOOD PRESSURE: 98 MMHG | HEART RATE: 89 BPM | HEIGHT: 71 IN

## 2024-08-13 DIAGNOSIS — Z00.00 ROUTINE HEALTH MAINTENANCE: ICD-10-CM

## 2024-08-13 DIAGNOSIS — R03.0 ELEVATED BLOOD PRESSURE READING IN OFFICE WITHOUT DIAGNOSIS OF HYPERTENSION: ICD-10-CM

## 2024-08-13 DIAGNOSIS — E78.00 PURE HYPERCHOLESTEROLEMIA: Primary | ICD-10-CM

## 2024-08-13 NOTE — PROGRESS NOTES
"Chief Complaint   Patient presents with    Follow-up    Hyperlipidemia       SUBJECTIVE  Elin Quiñonez is a 39 y.o. female presenting today for follow up of her chronic health conditions.    HLD - attempting TLC. Eating less red meat, more chicken/fish. Eating more vegetables. Avoiding fast food. Walking consistently.      Outpatient Medications Marked as Taking for the 8/13/24 encounter (Office Visit) with Laura Barclay APRN   Medication Sig Dispense Refill    Ascorbic Acid (VITAMIN C PO) Take 1 capsule by mouth Daily.      FIBER PO Take  by mouth.      MAGNESIUM PO Take  by mouth.      Multiple Vitamin (MULTIVITAMIN PO) Take 1 tablet by mouth Daily.      VITAMIN D PO Take 1 capsule by mouth Daily.           The following portions of the patient's history were reviewed and updated as appropriate: allergies, current medications, past family history, past medical history, past social history, past surgical history and problem list.        Objective   Vitals:    08/13/24 0745   BP: 142/98   BP Location: Right arm   Patient Position: Sitting   Cuff Size: Adult   Pulse: 89   Temp: 98.3 °F (36.8 °C)   TempSrc: Infrared   SpO2: 100%   Weight: 77.2 kg (170 lb 3.2 oz)   Height: 180.3 cm (70.98\")       BP Readings from Last 3 Encounters:   08/13/24 142/98   02/12/24 128/76   02/12/24 124/82        Wt Readings from Last 3 Encounters:   08/13/24 77.2 kg (170 lb 3.2 oz)   02/12/24 75.8 kg (167 lb 3.2 oz)   02/12/24 75.8 kg (167 lb 3.2 oz)        Body mass index is 23.75 kg/m².  Nursing notes and vitals reviewed.    Physical Exam  Constitutional:       General: She is not in acute distress.     Appearance: She is well-developed.   Pulmonary:      Effort: Pulmonary effort is normal.   Neurological:      Mental Status: She is alert.   Psychiatric:         Attention and Perception: She is attentive.         Speech: Speech normal.         Recent Results (from the past 672 hour(s))   Lipid Panel With / Chol / HDL Ratio    " Collection Time: 08/06/24  8:50 AM    Specimen: Blood   Result Value Ref Range    Total Cholesterol 195 0 - 200 mg/dL    Triglycerides 105 0 - 150 mg/dL    HDL Cholesterol 74 (H) 40 - 60 mg/dL    VLDL Cholesterol [place] 18 5 - 40 mg/dL    LDL Chol Calc (NIH) 103 (H) 0 - 100 mg/dL    Chol/HDL Ratio 2.64    Comprehensive Metabolic Panel    Collection Time: 08/06/24  8:50 AM    Specimen: Blood   Result Value Ref Range    Glucose 103 (H) 65 - 99 mg/dL    BUN 9 6 - 20 mg/dL    Creatinine 0.73 0.57 - 1.00 mg/dL    EGFR Result 108.8 >60.0 mL/min/1.73    BUN/Creatinine Ratio 12.3 7.0 - 25.0    Sodium 138 136 - 145 mmol/L    Potassium 4.4 3.5 - 5.2 mmol/L    Chloride 99 98 - 107 mmol/L    Total CO2 22.5 22.0 - 29.0 mmol/L    Calcium 9.6 8.6 - 10.5 mg/dL    Total Protein 6.8 6.0 - 8.5 g/dL    Albumin 4.8 3.5 - 5.2 g/dL    Globulin 2.0 gm/dL    A/G Ratio 2.4 g/dL    Total Bilirubin 0.4 0.0 - 1.2 mg/dL    Alkaline Phosphatase 38 (L) 39 - 117 U/L    AST (SGOT) 30 1 - 32 U/L    ALT (SGPT) 27 1 - 33 U/L         Assessment & Plan   Diagnoses and all orders for this visit:    1. Pure hypercholesterolemia (Primary)    2. Elevated blood pressure reading in office without diagnosis of hypertension        #1. Improved w/ TLC. Continue these.    2. Very stressed about appt this a.m. Reassured.        Medications, including side effects, were discussed with the patient. Patient verbalized understanding.  The plan of care was discussed. All questions were answered. Patient verbalized understanding.      Return in about 6 months (around 2/13/2025) for fasting labs one week prior to, Annual physical.

## 2024-11-25 ENCOUNTER — TELEPHONE (OUTPATIENT)
Dept: GASTROENTEROLOGY | Facility: CLINIC | Age: 39
End: 2024-11-25
Payer: COMMERCIAL

## 2024-11-25 NOTE — TELEPHONE ENCOUNTER
With Provider: EVERETT SAHU [[facility] [place] [place] [facility]]  Preferred Date Range: 11/28/2024 - 11/29/2024  Preferred Times: Any Time  Reason for Visit: Follow-up  Comments: Reoccurring fissure

## 2025-01-29 DIAGNOSIS — Z00.00 ROUTINE HEALTH MAINTENANCE: ICD-10-CM

## 2025-03-17 ENCOUNTER — TELEPHONE (OUTPATIENT)
Dept: INTERNAL MEDICINE | Facility: CLINIC | Age: 40
End: 2025-03-17
Payer: COMMERCIAL

## 2025-03-17 NOTE — TELEPHONE ENCOUNTER
Caller: Elin Quiñonez     Relationship: SELF    Best call back number: [phone]     What is your medical concern? STREP THROAT, SORE THROAT, SCRATCHY THROAT, VOMITING     How long has this issue been going on? NAUSEOUS AND FATIGUE YESTERDAY, BUT TODAY VOMITING, PAINFUL SWALLOW AND EARS     Is your provider already aware of this issue? NO     Have you been treated for this issue? NO

## 2025-03-19 ENCOUNTER — PATIENT ROUNDING (BHMG ONLY) (OUTPATIENT)
Dept: URGENT CARE | Facility: CLINIC | Age: 40
End: 2025-03-19
Payer: COMMERCIAL

## 2025-03-19 NOTE — ED NOTES
Thank you for letting us care for you in your recent visit to our [facility] [place]. We would love to hear about your experience with us.    We’re always looking for ways to make our patients’ experiences even better. Do you have any recommendations on ways we may improve?     I appreciate you taking the time to respond. Please be on the lookout for a survey about your recent visit from Freddy Cohen via text or email. We would greatly appreciate if you could fill that out and turn it back in. We want your voice to be heard and we value your feedback.   Thank you for choosing [facility] for your healthcare needs.     Felipe Montenegro RN/Practice Manager

## 2025-04-03 ENCOUNTER — OFFICE VISIT (OUTPATIENT)
Dept: OBSTETRICS AND GYNECOLOGY | Facility: CLINIC | Age: 40
End: 2025-04-03
Payer: COMMERCIAL

## 2025-04-03 VITALS
WEIGHT: 172.8 LBS | BODY MASS INDEX: 24.19 KG/M2 | DIASTOLIC BLOOD PRESSURE: 98 MMHG | SYSTOLIC BLOOD PRESSURE: 142 MMHG | HEIGHT: 71 IN

## 2025-04-03 DIAGNOSIS — Z01.419 CERVICAL SMEAR, AS PART OF ROUTINE GYNECOLOGICAL EXAMINATION: ICD-10-CM

## 2025-04-03 DIAGNOSIS — Z01.419 ROUTINE GYNECOLOGICAL EXAMINATION: Primary | ICD-10-CM

## 2025-04-03 DIAGNOSIS — N92.6 IRREGULAR MENSES: ICD-10-CM

## 2025-04-03 DIAGNOSIS — Z11.51 SPECIAL SCREENING EXAMINATION FOR HUMAN PAPILLOMAVIRUS (HPV): ICD-10-CM

## 2025-04-03 LAB
B-HCG UR QL: NEGATIVE
BILIRUB BLD-MCNC: NEGATIVE MG/DL
CLARITY, POC: CLEAR
COLOR UR: YELLOW
EXPIRATION DATE: NORMAL
GLUCOSE UR STRIP-MCNC: NEGATIVE MG/DL
INTERNAL NEGATIVE CONTROL: NORMAL
INTERNAL POSITIVE CONTROL: NORMAL
KETONES UR QL: NEGATIVE
LEUKOCYTE EST, POC: NEGATIVE
Lab: NORMAL
NITRITE UR-MCNC: NEGATIVE MG/ML
PH UR: 6.5 [PH] (ref 5–8)
PROT UR STRIP-MCNC: ABNORMAL MG/DL
RBC # UR STRIP: NEGATIVE /UL
SP GR UR: 1 (ref 1–1.03)
UROBILINOGEN UR QL: ABNORMAL

## 2025-04-03 NOTE — PROGRESS NOTES
GYN Annual Exam     Chief Complaint   Patient presents with    Gynecologic Exam       Elin Quiñonez is a 39 y.o. female who presents for annual well woman exam. She is an  established patient.  She is sexually active. Currently using vasectomy for contraception. She is happy with her contraception: Yes.     Periods are  irregular . Reports she has random bleeding, it either is early or late. She normally bleeds for 3-4 days. Sometimes she will bleed 6 days.  Dysmenorrhea: mild, occurring premenstrually. Cyclic symptoms include none.     Performing SBE: occasionally     HPI    OB History          2    Para   1    Term   1            AB   1    Living   1         SAB        IAB        Ectopic        Molar        Multiple        Live Births   1                Last Pap: - NIL  Last Mammogram: N/A  Last Colonoscopy: 3/2022- polyp; repeat in 10 years; strong family hx of polyps  History of abnormal Pap smear: yes - long time ago; s/p procedure  Family history of uterine, colon or ovarian cancer: no  Family history of breast cancer: yes - maternal cousin dx'd at 38 y.o.  History of abnormal mammogram:  N/A  Gardasil Vaccine: no    Past Medical History:   Diagnosis Date    Constipation     Depression     Diarrhea     Eczema     WINTER TIME    Migraine        Past Surgical History:   Procedure Laterality Date    COLONOSCOPY N/A 2022    Procedure: COLONOSCOPY INTO CECUM AND T.I. WITH COLD BIOPSY POLYPECTOMY;  Surgeon: Renny Mancilla MD;  Location: [facility];  Service: Gastroenterology;  Laterality: N/A;  PRE- CHANGE IN BOWEL HABITS, RECTAL BLEEDING  POST- POLYP, FISSURE    INDUCED            Current Outpatient Medications:     Ascorbic Acid (VITAMIN C PO), Take 1 capsule by mouth Daily., Disp: , Rfl:     FIBER PO, Take  by mouth., Disp: , Rfl:     MAGNESIUM PO, Take  by mouth., Disp: , Rfl:     Multiple Vitamin (MULTIVITAMIN PO), Take 1 tablet by mouth Daily., Disp: , Rfl:  "    VITAMIN D PO, Take 1 capsule by mouth Daily., Disp: , Rfl:     No Known Allergies    Social History     Tobacco Use    Smoking status: Former     Current packs/day: 0.00     Average packs/day: 1.5 packs/day for 17.0 years (25.5 ttl pk-yrs)     Types: Cigarettes     Start date:      Quit date:      Years since quittin.2     Passive exposure: Past    Smokeless tobacco: Never    Tobacco comments:     vape   Vaping Use    Vaping status: Some Days    Substances: Nicotine, CBD, daily while drinking alcohol    Devices: Disposable   Substance Use Topics    Alcohol use: Yes     Comment: 2-3 drinks daily with  SELTZER    Drug use: Not Currently       Family History   Problem Relation Age of Onset    Colon polyps Father     Thyroid disease Father     Hypertension Father     Hyperlipidemia Father     Colon polyps Mother     Thyroid disease Mother     Colon polyps Paternal Grandfather     Diabetes Paternal Grandfather     Colon polyps Paternal Grandmother     Colon polyps Maternal Grandmother     Colon polyps Maternal Grandfather     Colon polyps Maternal Aunt     Colon polyps Maternal Uncle     Colon polyps Paternal Aunt     Colon polyps Paternal Uncle     Breast cancer Maternal Cousin 38    Colon cancer Neg Hx     Malig Hyperthermia Neg Hx     Ovarian cancer Neg Hx     Uterine cancer Neg Hx        Review of Systems   Genitourinary:  Positive for menstrual problem.         /98   Ht 180.3 cm (70.98\")   Wt 78.4 kg (172 lb 12.8 oz)   LMP 03/15/2025 (Exact Date)   Breastfeeding No   BMI 24.11 kg/m²     Physical Exam  Vitals reviewed.   Constitutional:       General: She is awake. She is not in acute distress.     Appearance: She is not ill-appearing.   HENT:      Head: Normocephalic and atraumatic.   Eyes:      Conjunctiva/sclera: Conjunctivae normal.   Pulmonary:      Effort: Pulmonary effort is normal. No respiratory distress.   Chest:   Breasts:     Right: Normal.      Left: Normal.   Abdominal:      " "Palpations: Abdomen is soft. There is no mass.      Tenderness: There is no abdominal tenderness.   Genitourinary:     General: Normal vulva.      Exam position: Supine.      Pubic Area: No rash.       Labia:         Right: No rash, tenderness or lesion.         Left: No rash, tenderness or lesion.       Vagina: Normal.      Cervix: Normal.      Uterus: Normal.       Adnexa: Right adnexa normal and left adnexa normal.   Musculoskeletal:      Cervical back: Neck supple. No rigidity.   Lymphadenopathy:      Upper Body:      Right upper body: No axillary adenopathy.      Left upper body: No axillary adenopathy.      Lower Body: No right inguinal adenopathy. No left inguinal adenopathy.   Skin:     General: Skin is warm and dry.      Capillary Refill: Capillary refill takes less than 2 seconds.   Neurological:      Mental Status: She is alert and oriented to person, place, and time.   Psychiatric:         Mood and Affect: Mood and affect normal.         Behavior: Behavior normal.            Assessment     Well woman exam   Vasectomy   Irregular periods   HPV vaccine   Elevated BP       Plan   Well woman exam: Pap collected- Yes. Instructed on how to perform SBE. Recommend MVI daily.    Breast Health: Clinical breast exam & Self breast awareness. MMG @ 42   Irregular bleeding: Check TVUS and labs- CBC, ferritin, and thyroid panel with TPO. She declines contraception to regulate, states, \"it made me go psycho last time.\" Enc pt to consider IUD or NuvaRing for cycle regulation. Also disc possibility of endometrial ablation.   Colon health:  colonoscopy due at 47 y.o.  Contraception: spouse has a vasectomy  STD: Desires STD screen today- No.   Gardasil: Declined vaccine   Smoking status:  Vapes. Enc cessation.   Body mass index is 24.11 kg/m². Diet and exercised discussed.  Elevated BP- Pt made aware of elevated BP.     Follow-up as needed and 1 year for ARMEN Brewer, [facility]  4/3/2025  09:49 EDT  "

## 2025-04-04 LAB
BASOPHILS # BLD AUTO: 0.03 10*3/MM3 (ref 0–0.2)
BASOPHILS NFR BLD AUTO: 0.6 % (ref 0–1.5)
EOSINOPHIL # BLD AUTO: 0.09 10*3/MM3 (ref 0–0.4)
EOSINOPHIL NFR BLD AUTO: 1.7 % (ref 0.3–6.2)
ERYTHROCYTE [DISTWIDTH] IN BLOOD BY AUTOMATED COUNT: 12.3 % (ref 12.3–15.4)
FERRITIN SERPL-MCNC: 367 NG/ML (ref 13–150)
HCT VFR BLD AUTO: 39.3 % (ref 34–46.6)
HGB BLD-MCNC: 13.8 G/DL (ref 12–15.9)
IMM GRANULOCYTES # BLD AUTO: 0.02 10*3/MM3 (ref 0–0.05)
IMM GRANULOCYTES NFR BLD AUTO: 0.4 % (ref 0–0.5)
LYMPHOCYTES # BLD AUTO: 0.96 10*3/MM3 (ref 0.7–3.1)
LYMPHOCYTES NFR BLD AUTO: 18 % (ref 19.6–45.3)
MCH RBC QN AUTO: 34.8 PG (ref 26.6–33)
MCHC RBC AUTO-ENTMCNC: 35.1 G/DL (ref 31.5–35.7)
MCV RBC AUTO: 99 FL (ref 79–97)
MONOCYTES # BLD AUTO: 0.59 10*3/MM3 (ref 0.1–0.9)
MONOCYTES NFR BLD AUTO: 11.1 % (ref 5–12)
NEUTROPHILS # BLD AUTO: 3.63 10*3/MM3 (ref 1.7–7)
NEUTROPHILS NFR BLD AUTO: 68.2 % (ref 42.7–76)
NRBC BLD AUTO-RTO: 0 /100 WBC (ref 0–0.2)
PLATELET # BLD AUTO: 200 10*3/MM3 (ref 140–450)
RBC # BLD AUTO: 3.97 10*6/MM3 (ref 3.77–5.28)
T3 SERPL-MCNC: 104 NG/DL (ref 80–200)
T4 FREE SERPL-MCNC: 0.9 NG/DL (ref 0.92–1.68)
THYROPEROXIDASE AB SERPL-ACNC: 13 IU/ML (ref 0–34)
TSH SERPL DL<=0.005 MIU/L-ACNC: 1.04 UIU/ML (ref 0.27–4.2)
WBC # BLD AUTO: 5.32 10*3/MM3 (ref 3.4–10.8)

## 2025-04-08 LAB
CYTOLOGIST CVX/VAG CYTO: NORMAL
CYTOLOGY CVX/VAG DOC CYTO: NORMAL
CYTOLOGY CVX/VAG DOC THIN PREP: NORMAL
DX ICD CODE: NORMAL
HPV I/H RISK 4 DNA CVX QL PROBE+SIG AMP: NEGATIVE
OTHER STN SPEC: NORMAL
SERVICE CMNT-IMP: NORMAL
STAT OF ADQ CVX/VAG CYTO-IMP: NORMAL

## 2025-04-15 ENCOUNTER — TELEPHONE (OUTPATIENT)
Dept: OBSTETRICS AND GYNECOLOGY | Facility: CLINIC | Age: 40
End: 2025-04-15
Payer: COMMERCIAL

## 2025-05-12 RX ORDER — ONDANSETRON 4 MG/1
4 TABLET, FILM COATED ORAL EVERY 8 HOURS PRN
Qty: 30 TABLET | Refills: 0 | Status: SHIPPED | OUTPATIENT
Start: 2025-05-12 | End: 2026-05-12

## 2025-06-03 ENCOUNTER — OFFICE VISIT (OUTPATIENT)
Dept: INTERNAL MEDICINE | Facility: CLINIC | Age: 40
End: 2025-06-03
Payer: COMMERCIAL

## 2025-06-03 VITALS
BODY MASS INDEX: 23.66 KG/M2 | TEMPERATURE: 98.6 F | DIASTOLIC BLOOD PRESSURE: 90 MMHG | HEIGHT: 71 IN | WEIGHT: 169 LBS | OXYGEN SATURATION: 97 % | SYSTOLIC BLOOD PRESSURE: 138 MMHG | HEART RATE: 102 BPM

## 2025-06-03 DIAGNOSIS — F33.1 MODERATE EPISODE OF RECURRENT MAJOR DEPRESSIVE DISORDER: ICD-10-CM

## 2025-06-03 DIAGNOSIS — F41.1 GENERALIZED ANXIETY DISORDER: Primary | ICD-10-CM

## 2025-06-03 PROCEDURE — 99214 OFFICE O/P EST MOD 30 MIN: CPT | Performed by: NURSE PRACTITIONER

## 2025-06-03 RX ORDER — HYDROXYZINE HYDROCHLORIDE 10 MG/1
10 TABLET, FILM COATED ORAL EVERY 8 HOURS PRN
Qty: 60 TABLET | Refills: 1 | Status: SHIPPED | OUTPATIENT
Start: 2025-06-03

## 2025-06-03 RX ORDER — ESCITALOPRAM OXALATE 5 MG/1
5 TABLET ORAL DAILY
Qty: 30 TABLET | Refills: 1 | Status: SHIPPED | OUTPATIENT
Start: 2025-06-03

## 2025-06-03 NOTE — PROGRESS NOTES
Chief Complaint   Patient presents with    Anxiety     She feels like these feelings of anxiety and depression have been manifesting for awhile and ever since she graduated school things have worsened.    Depression       SUBJECTIVE  Elin Quiñonez is a 40 y.o. female presenting today for follow up of her chronic health conditions.    Presents today for recurrence of chronic anxiety and depression.  She describes S&S including dread, isolation, lack of motivation, fear, excessive sleepiness, low sex drive, change in appetite. She affirms panic attacks.    She has an initial apt set up to begin therapy next week.    PHQ-9 Depression Screening  Little interest or pleasure in doing things? More than half the days   Feeling down, depressed, or hopeless? Several days   PHQ-2 Total Score 3   Trouble falling or staying asleep, or sleeping too much? Nearly every day   Feeling tired or having little energy? More than half the days   Poor appetite or overeating? More than half the days   Feeling bad about yourself - or that you are a failure or have let yourself or your family down? Several days   Trouble concentrating on things, such as reading the newspaper or watching television? Nearly every day   Moving or speaking so slowly that other people could have noticed? Or the opposite - being so fidgety or restless that you have been moving around a lot more than usual? More than half the days     Thoughts that you would be better off dead, or of hurting yourself in some way? Not at all   PHQ-9 Total Score 16   If you checked off any problems, how difficult have these problems made it for you to do your work, take care of things at home, or get along with other people? Very difficult         Over the last two weeks, how often have you been bothered by the following problems?  Feeling nervous, anxious or on edge: More than half the days  Not being able to stop or control worrying: More than half the days  Worrying too much  "about different things: More than half the days  Trouble Relaxing: Nearly every day  Being so restless that it is hard to sit still: More than half the days  Becoming easily annoyed or irritable: More than half the days  Feeling afraid as if something awful might happen: More than half the days  BILL 7 Total Score: 15  If you checked any problems, how difficult have these problems made it for you to do your work, take care of things at home, or get along with other people: Very difficult      Outpatient Medications Marked as Taking for the 6/3/25 encounter (Office Visit) with Laura Barclay APRN   Medication Sig Dispense Refill    Ascorbic Acid (VITAMIN C PO) Take 1 capsule by mouth Daily.      FIBER PO Take  by mouth.      MAGNESIUM PO Take  by mouth.      ondansetron (Zofran) 4 MG tablet Take 1 tablet by mouth Every 8 (Eight) Hours As Needed for Nausea or Vomiting. 30 tablet 0    VITAMIN D PO Take 1 capsule by mouth Daily.           The following portions of the patient's history were reviewed and updated as appropriate: allergies, current medications, past family history, past medical history, past social history, past surgical history and problem list.    Review of Systems   Psychiatric/Behavioral:  Negative for self-injury and suicidal ideas.        Objective   Vitals:    06/03/25 1048   BP: 138/90   BP Location: Left arm   Patient Position: Sitting   Cuff Size: Adult   Pulse: 102   Temp: 98.6 °F (37 °C)   TempSrc: Infrared   SpO2: 97%   Weight: 76.7 kg (169 lb)   Height: 180.3 cm (71\")       BP Readings from Last 3 Encounters:   06/03/25 138/90   04/03/25 142/98   03/18/25 147/97        Wt Readings from Last 3 Encounters:   06/03/25 76.7 kg (169 lb)   04/03/25 78.4 kg (172 lb 12.8 oz)   03/18/25 77.1 kg (170 lb)        Body mass index is 23.57 kg/m².  Nursing notes and vitals reviewed.    Physical Exam  Constitutional:       General: She is not in acute distress.     Appearance: She is well-developed. "   Pulmonary:      Effort: Pulmonary effort is normal.   Neurological:      Mental Status: She is alert.   Psychiatric:         Attention and Perception: She is attentive.         Mood and Affect: Mood is anxious. Affect is tearful.         Speech: Speech normal.         Behavior: Behavior is agitated.         Thought Content: Thought content normal.         Cognition and Memory: Cognition normal.           Data Reviewed:  Recent Results (from the past 4 weeks)   T4, Free    Collection Time: 05/08/25  8:27 AM    Specimen: Blood   Result Value Ref Range    Free T4 1.20 0.92 - 1.68 ng/dL         Assessment and Plan:       Generalized anxiety disorder      Orders:    escitalopram (Lexapro) 5 MG tablet; Take 1 tablet by mouth Daily.    hydrOXYzine (ATARAX) 10 MG tablet; Take 1 tablet by mouth Every 8 (Eight) Hours As Needed for Anxiety.    Moderate episode of recurrent major depressive disorder      Orders:    escitalopram (Lexapro) 5 MG tablet; Take 1 tablet by mouth Daily.        Medications, including side effects, were discussed with the patient. Patient verbalized understanding.  The plan of care was discussed. All questions were answered. Patient verbalized understanding.      Return - as previously scheduled for 07/07/2025.

## 2025-06-09 ENCOUNTER — NURSE TRIAGE (OUTPATIENT)
Dept: CALL CENTER | Facility: HOSPITAL | Age: 40
End: 2025-06-09
Payer: COMMERCIAL

## 2025-06-09 ENCOUNTER — TELEPHONE (OUTPATIENT)
Dept: INTERNAL MEDICINE | Facility: CLINIC | Age: 40
End: 2025-06-09
Payer: COMMERCIAL

## 2025-06-09 NOTE — TELEPHONE ENCOUNTER
Caller: Quiñonez Elin    Relationship: Self    Best call back number: [phone]     What medication are you requesting: ANXIETY MEDICATION     What are your current symptoms: SEVERE ANXIETY, SHAKING, NAUSEA, NERVOUSNESS    How long have you been experiencing symptoms: ABOUT 3-4 DAYS     Have you had these symptoms before:    [x] Yes  [] No    Have you been treated for these symptoms before:   [x] Yes  [] No    If a prescription is needed, what is your preferred pharmacy and phone number: [facility] [phone] - [facility] [id number] [address] [facility] [address] [facility] [phone] [facility] [phone]      Additional notes: PATIENT IS CURRENTLY BEING TAKEN OFF OF LEXAPRO AND IS HAVING SEVERE WITHDRAWALS SYMPTOMS. IS NEEDING A DIFFERENT TYPE OF ANXIETY MEDICATION

## 2025-06-09 NOTE — TELEPHONE ENCOUNTER
[facility] forwarded call for pt anxiety, crying on phone. Per [facility], pt is not suicidal and has no plans to harm self.  Pt tearful and anxious on phone with this RN. Pt was told yesterday by Dr. Bond to stop Lexapro 5 mg po daily as pt was having nausea and did not feel good on med. This am, pt began having shakiness, nausea and severe anxiety and crying. This RN listening and giving support. RN asked if pt feels suicidal, pt denies. She just feels very anxious and wants to know if this is normal when stopping a med such as Lexapro. This RN gave education and attempted warm transfer to back line of office at [phone]. No answer. RN back on phone with pt and [facility] calling pt. RN let pt go to speak with her PCP. Pt was no longer crying and spouse was sitting next to pt at home.                   Reason for Disposition   [1] Panic attack symptoms (diagnosed in the past) AND [2] not better with usual treatment, reassurance, or Care Advice    Additional Information   Negative: SEVERE difficulty breathing (e.g., struggling for each breath, speaks in single words)   Negative: Bluish (or gray) lips or face now   Negative: Difficult to awaken or acting confused (e.g., disoriented, slurred speech)   Negative: Violent behavior, or threatening to physically hurt or kill someone   Negative: Sounds like a life-threatening emergency to the triager   Negative: Chest pain   Negative: Palpitations, skipped heartbeat, or rapid heartbeat is main symptom   Negative: Cough is main symptom   Negative: Suicide thoughts, threats, attempts, or questions   Negative: Depression is main problem or symptom (e.g., feelings of sadness or hopelessness)   Negative: [1] Difficulty breathing AND [2] persists > 10 minutes AND [3] not relieved by reassurance provided by triager   Negative: [1] Lightheadedness or dizziness AND [2] persists > 10 minutes AND [3] not relieved by reassurance provided by triager   Negative: [1] SEVERE anxiety (e.g.,  "extremely anxious with intense emotional symptoms such as feeling of unreality, urge to flee, unable to calm down; unable to cope or function) AND [2] not better after 10 minutes of reassurance and Care Advice   Negative: [1] Panic attack symptoms (e.g., sudden onset of intense fear and symptoms such as dizziness, feeling of impending doom or fear of dying, hyperventilation, numbness or tingling, sweating, trembling) AND [2] has not been evaluated for this by doctor (or NP/PA)    Answer Assessment - Initial Assessment Questions  1. CONCERN: \"Did anything happen that prompted you to call today?\"       \"I was told by Dr. Bond to stop my Lexapro 5 mg yesterday and it is awful. I feel awful.\" Pt tearful on phone.  2. ANXIETY SYMPTOMS: \"Can you describe how you (your loved one; patient) have been feeling?\" (e.g., tense, restless, panicky, anxious, keyed up, overwhelmed, sense of impending doom).       Anxious, crying. Pt denies SI. Spouse with pt at home.  3. ONSET: \"How long have you been feeling this way?\" (e.g., hours, days, weeks)      This am  4. SEVERITY: \"How would you rate the level of anxiety?\" (e.g., 0 - 10; or mild, moderate, severe).      8/10  5. FUNCTIONAL IMPAIRMENT: \"How have these feelings affected your ability to do daily activities?\" \"Have you had more difficulty than usual doing your normal daily activities?\" (e.g., getting better, same, worse; self-care, school, work, interactions)      na  6. HISTORY: \"Have you felt this way before?\" \"Have you ever been diagnosed with an anxiety problem in the past?\" (e.g., generalized anxiety disorder, panic attacks, PTSD). If Yes, ask: \"How was this problem treated?\" (e.g., medicines, counseling, etc.)      Anxiety  7. RISK OF HARM - SUICIDAL IDEATION: \"Do you ever have thoughts of hurting or killing yourself?\" If Yes, ask:  \"Do you have these feelings now?\" \"Do you have a plan on how you would do this?\"      no  8. TREATMENT:  \"What has been done so far to treat " "this anxiety?\" (e.g., medicines, relaxation strategies). \"What has helped?\"      Weaning off Lexapro because she felt sick on it.  9. TREATMENT - THERAPIST: \"Do you have a counselor or therapist? Name?\"      Laura Barclay  10. POTENTIAL TRIGGERS: \"Do you drink caffeinated beverages (e.g., coffee, bethel, teas), and how much daily?\" \"Do you drink alcohol or use any drugs?\" \"Have you started any new medicines recently?\"        unknown  11. PATIENT SUPPORT: \"Who is with you now?\" \"Who do you live with?\" \"Do you have family or friends who you can talk to?\"         Yes spouse   12. OTHER SYMPTOMS: \"Do you have any other symptoms?\" (e.g., feeling depressed, trouble concentrating, trouble sleeping, trouble breathing, palpitations or fast heartbeat, chest pain, sweating, nausea, or diarrhea)        nausea  13. PREGNANCY: \"Is there any chance you are pregnant?\" \"When was your last menstrual period?\"        no    Protocols used: Anxiety and Panic Attack-ADULT-AH    "

## 2025-06-09 NOTE — TELEPHONE ENCOUNTER
Patient advised that she is not able to do a telehealth appointment today but she can come in tomorrow.     When I asked if she is having any suicidal ideations or intent of self harm she said no.    I scheduled her in an acute slot tomorrow at 11 am.

## 2025-06-12 ENCOUNTER — TELEMEDICINE (OUTPATIENT)
Dept: INTERNAL MEDICINE | Facility: CLINIC | Age: 40
End: 2025-06-12
Payer: COMMERCIAL

## 2025-06-12 VITALS — HEIGHT: 71 IN | WEIGHT: 165.1 LBS | BODY MASS INDEX: 23.11 KG/M2

## 2025-06-12 DIAGNOSIS — F41.1 GENERALIZED ANXIETY DISORDER: Primary | ICD-10-CM

## 2025-06-12 DIAGNOSIS — F33.1 MODERATE EPISODE OF RECURRENT MAJOR DEPRESSIVE DISORDER: ICD-10-CM

## 2025-06-12 PROCEDURE — 99214 OFFICE O/P EST MOD 30 MIN: CPT | Performed by: NURSE PRACTITIONER

## 2025-06-12 RX ORDER — BUPROPION HYDROCHLORIDE 150 MG/1
150 TABLET ORAL EVERY MORNING
Qty: 30 TABLET | Refills: 1 | Status: SHIPPED | OUTPATIENT
Start: 2025-06-12

## 2025-06-12 RX ORDER — CLONAZEPAM 0.5 MG/1
0.5 TABLET ORAL
Qty: 14 TABLET | Refills: 0 | Status: SHIPPED | OUTPATIENT
Start: 2025-06-12 | End: 2025-06-26

## 2025-06-12 NOTE — PROGRESS NOTES
Elin Quiñonez is a 40 y.o. female presenting today for   Chief Complaint   Patient presents with    Anxiety     Last dose of Lexapro was Sunday 2.5mg, is taking the Hydroxizine but it makes it hard for to sleep, has not eaten in a few days     This visit was conducted via [facility].    At the time of this visit, I am located in my office and the patient in his/her home.      Subjective    Anxiety       Pt sts she experienced severe nausea upon starting Lexapro. She d/c'ed this medication 5 days ago. She took this 6/3-6/8. She is not sleeping. She is very anxious. She is unable to work. She is consuming 6-7 beers/daily. She is vaping nicotine frequently. She has an appt scheduled to begin psychotherapy on 06/17/2025. She denies SI/HI. She sts she does not want to take SSRIs because of things she has seen online. She took Wellbutrin 9 years ago for post-partum anxiety and tolerated this well.      The following portions of the patient's history were reviewed and updated as appropriate: allergies, current medications, problem list, past medical history, past surgical history, family history, and social history.          Objective      Physical Exam  Constitutional:       General: She is not in acute distress.     Appearance: She is well-developed.   Pulmonary:      Effort: Pulmonary effort is normal.   Neurological:      Mental Status: She is alert.   Psychiatric:         Attention and Perception: She is attentive.         Mood and Affect: Mood is anxious. Affect is tearful.         Speech: Speech normal.         Behavior: Behavior is agitated.           Assessment and Plan:       Generalized anxiety disorder      Orders:    buPROPion XL (Wellbutrin XL) 150 MG 24 hr tablet; Take 1 tablet by mouth Every Morning.    clonazePAM (KlonoPIN) 0.5 MG tablet; Take 1 tablet by mouth every night at bedtime for 14 days.    Moderate episode of recurrent major depressive disorder      Orders:    buPROPion XL  (Wellbutrin XL) 150 MG 24 hr tablet; Take 1 tablet by mouth Every Morning.      Advised to avoid ETOH entirely.  Advised to reduce vaping and encouraged to avoid if at all possible.  Agree w/ need for psychotherapy.      Medications, including side effects, were discussed with the patient. Patient verbalized understanding.  The plan of care was discussed. All questions were answered. Patient verbalized understanding.        Return - as scheduled on 07/07/2025, sooner if needed.

## 2025-06-24 ENCOUNTER — TELEPHONE (OUTPATIENT)
Dept: PSYCHIATRY | Facility: HOSPITAL | Age: 40
End: 2025-06-24
Payer: COMMERCIAL

## 2025-06-24 ENCOUNTER — DOCUMENTATION (OUTPATIENT)
Dept: PSYCHIATRY | Facility: HOSPITAL | Age: 40
End: 2025-06-24
Payer: COMMERCIAL

## 2025-06-24 ENCOUNTER — HOSPITAL ENCOUNTER (OUTPATIENT)
Dept: PSYCHIATRY | Facility: HOSPITAL | Age: 40
Discharge: HOME | End: 2025-06-24
Payer: COMMERCIAL

## 2025-06-24 NOTE — PROGRESS NOTES
"Pt initially began the assessment with Trini MARTINEZ today As more information pt gave became clear that pt may need [facility] IOP rather than psychiatric IOP  According to Trini's assessment Pt oriented X4 with \"no\" AVH nor any apparent psychosis Pt tearful and anxious during evaluation Pt states she has never had any psych nor MANISHA treatment   Pt PCP prescribed 150 mg of Wellbutrin for 1.5 weeks  Benzodiazepine .25 at night, Hydroxyzine 10 mg prn Pt prescribed previously d/c as pt had unacceptable side effects Pt reporting severe anxiety 3-4 weeks ago Pt has not been working for 2 weeks Pt has degree in Early Childhood and works at a [employer] for the last 11 years Pt struggling now with ADL's   Pt has reduced appetite  Pt taking protein snacks   Pt sleeping has increased Pt has broken sleep and it is difficult to go back to sleep when she wakes  Pt reports twitching of hands and legs \"nervous\" tremens Pt has an appointment for intake with psychiatrist Thursday 6-26-25   Pt PCP Laura Linda  Pt denied SI or HI Pt denies any history of self harming Pt diagnosed depression at age 19 Wellbutrin helpful at that time  Pt states that she has never been abused in any way and currently not being threatened Pt feels her  is her primary support with both in laws and pt mother supportive  Pt  works at [employer] They have 1 child a son Jacobo \"I worried about me Jacobo hs no health concerns Pt has been with Jacobo stroy 2004  Pt mother diagnosed with depression and anxiety Pt father has Bipolar disorder  Pt maternal grandmother alcoholic   Pt has been consuming ETOH daily for the last 6 months Pt reports is 10 12 oz beers at day Pt first ETOH intoxification was at age [age] \"Or perhaps 20\"  Pt has not been abstinent since Pt states her last drink \"1 white claw today\"  Pt using Nicotine vape multiple times per day  Pt history of psychoactive substances \"Many years ago\" Pt tried Xtacy/Ita and MJ    Pt denies " "abuse of OTC in the last week Pt used CBD and Delta 8 Pt has had black outs from ETOH  Educated [facility] and withdrawal symptoms ck list reviewed Educated pt ETOH and benzodiazepines can be life threatening Advised if any withdrawal symptoms occur call 911 or report to nearest ED. Pt anxious and asked to leave \"I want to lay down and chill Pt gave permission for this therapist to cristiano and check on pt later today  Pt stated she would like to start [facility] IOP  Will call pt this pm and advise her that she needs medical clearance before starting [facility] IOP    "

## 2025-06-24 NOTE — TELEPHONE ENCOUNTER
Pt returned this therapist TC   Educated pt to have medical clearance before starting [facility] IOP on Thursday 6-26-25  Educated that ETOH withdrawal symptoms can be life threatening Pt going to see her PCP tomorrow  Educated pt what medical clearance is and why important for pt safety.  Pt will talk with her doctor

## 2025-07-07 ENCOUNTER — OFFICE VISIT (OUTPATIENT)
Dept: INTERNAL MEDICINE | Facility: CLINIC | Age: 40
End: 2025-07-07
Payer: COMMERCIAL

## 2025-07-07 VITALS
OXYGEN SATURATION: 100 % | DIASTOLIC BLOOD PRESSURE: 84 MMHG | SYSTOLIC BLOOD PRESSURE: 122 MMHG | BODY MASS INDEX: 22.6 KG/M2 | WEIGHT: 161.4 LBS | TEMPERATURE: 97.7 F | HEIGHT: 71 IN | HEART RATE: 60 BPM

## 2025-07-07 DIAGNOSIS — Z00.00 ENCOUNTER FOR WELLNESS EXAMINATION IN ADULT: Primary | ICD-10-CM

## 2025-07-07 DIAGNOSIS — D72.819 LEUKOPENIA, UNSPECIFIED TYPE: ICD-10-CM

## 2025-07-07 DIAGNOSIS — R74.01 TRANSAMINITIS: ICD-10-CM

## 2025-07-07 DIAGNOSIS — F10.10 ALCOHOL ABUSE: ICD-10-CM

## 2025-07-07 DIAGNOSIS — D69.6 THROMBOCYTOPENIA: ICD-10-CM

## 2025-07-07 DIAGNOSIS — F33.1 MODERATE EPISODE OF RECURRENT MAJOR DEPRESSIVE DISORDER: ICD-10-CM

## 2025-07-07 DIAGNOSIS — F41.1 GENERALIZED ANXIETY DISORDER: ICD-10-CM

## 2025-07-07 DIAGNOSIS — F51.04 PSYCHOPHYSIOLOGICAL INSOMNIA: ICD-10-CM

## 2025-07-07 PROCEDURE — 99395 PREV VISIT EST AGE 18-39: CPT | Performed by: NURSE PRACTITIONER

## 2025-07-07 RX ORDER — HYDROXYZINE PAMOATE 50 MG/1
50 CAPSULE ORAL AS NEEDED
COMMUNITY

## 2025-07-07 RX ORDER — TRAZODONE HYDROCHLORIDE 50 MG/1
50 TABLET ORAL NIGHTLY PRN
COMMUNITY
Start: 2025-07-03

## 2025-07-07 RX ORDER — VENLAFAXINE HYDROCHLORIDE 37.5 MG/1
37.5 CAPSULE, EXTENDED RELEASE ORAL DAILY
COMMUNITY
Start: 2025-06-26

## 2025-07-07 NOTE — PROGRESS NOTES
MITHC Worthington is a 40 y.o. female presenting for Annual Exam    Her current/chronic health conditions include:  Patient Active Problem List   Diagnosis    Constipation    Relies on partner vasectomy for contraception    Moderate episode of recurrent major depressive disorder    Generalized anxiety disorder    Psychophysiological insomnia    Alcohol abuse       Outpatient Medications Marked as Taking for the 7/7/25 encounter (Office Visit) with Laura Barclay APRN   Medication Sig Dispense Refill    Ascorbic Acid (VITAMIN C PO) Take 1 capsule by mouth Daily.      FIBER PO Take  by mouth.      hydrOXYzine pamoate (VISTARIL) 50 MG capsule Take 1 capsule by mouth As Needed for Anxiety.      MAGNESIUM PO Take  by mouth.      Multiple Vitamin (MULTIVITAMIN PO) Take 1 tablet by mouth Daily.      traZODone (DESYREL) 50 MG tablet Take 1 tablet by mouth At Night As Needed for Sleep.      venlafaxine XR (EFFEXOR-XR) 37.5 MG 24 hr capsule Take 1 capsule by mouth Daily.      VITAMIN D PO Take 1 capsule by mouth Daily.         Screenings:  PAP: UTD per GYN  Mammogram: n/a  Dexa: n/a  Colon Cancer: CLS in 2022  Tob use: former cigarette smoker; currently vaping         Additional E&M Note during same encounter follows:  Patient has additional, significant, and separately identifiable condition(s)/problem(s) that require work above and beyond the Annual Wellness Visit        Met w/ Dr. Hickey through [facility] on 06/24/2025. Dr. Hickey d/c'ed Wellbutrin and switched this to Effexor. After having an honest discussion about ETOH consumption, Elin and Dr. Hickey made a decision for her to be admitted to [facility]. She was in-patient at [facility] for 72 hours.  During that time she was noted to have elevated liver enzymes, leukopenia, and thrombocytopenia. She has had f/u w/ Psychiatry since d/c. She is attending AA meetings.       The following portions of the patient's history were reviewed and updated as  "appropriate: allergies, current medications, problem list, past medical history, past family history, and past social history.    Review of Systems   Psychiatric/Behavioral:  Positive for dysphoric mood and sleep disturbance. Negative for suicidal ideas. The patient is nervous/anxious.        OBJECTIVE    Vitals:    07/07/25 0957   BP: 122/84   BP Location: Left arm   Patient Position: Sitting   Cuff Size: Adult   Pulse: 60   Temp: 97.7 °F (36.5 °C)   TempSrc: Infrared   SpO2: 100%   Weight: 73.2 kg (161 lb 6.4 oz)   Height: 180.3 cm (71\")       BP Readings from Last 3 Encounters:   07/07/25 122/84   06/03/25 138/90   04/03/25 142/98        Wt Readings from Last 3 Encounters:   07/07/25 73.2 kg (161 lb 6.4 oz)   06/12/25 74.9 kg (165 lb 1.6 oz)   06/03/25 76.7 kg (169 lb)        Body mass index is 22.51 kg/m².  Nursing notes and vital signs reviewed.      Physical Exam  Constitutional:       General: She is not in acute distress.     Appearance: Normal appearance. She is well-developed.   HENT:      Head: Normocephalic.      Right Ear: Hearing, tympanic membrane, ear canal and external ear normal.      Left Ear: Hearing, tympanic membrane, ear canal and external ear normal.      Nose: Nose normal. No mucosal edema or rhinorrhea.      Mouth/Throat:      Mouth: Mucous membranes are moist.      Pharynx: Oropharynx is clear. Uvula midline.   Eyes:      General: Lids are normal.      Extraocular Movements: Extraocular movements intact.      Conjunctiva/sclera: Conjunctivae normal.      Pupils: Pupils are equal, round, and reactive to light.   Neck:      Thyroid: No thyroid mass or thyromegaly.   Cardiovascular:      Rate and Rhythm: Regular rhythm.      Pulses: Normal pulses.      Heart sounds: S1 normal and S2 normal. No murmur heard.     No friction rub. No gallop.   Pulmonary:      Effort: Pulmonary effort is normal.      Breath sounds: Normal breath sounds. No wheezing, rhonchi or rales.   Abdominal:      General: " Bowel sounds are normal.      Palpations: Abdomen is soft.      Tenderness: There is no abdominal tenderness. There is no guarding.      Hernia: No hernia is present.   Musculoskeletal:         General: No deformity. Normal range of motion.      Cervical back: Normal range of motion and neck supple.   Lymphadenopathy:      Cervical: No cervical adenopathy.   Skin:     General: Skin is warm and dry.      Findings: No lesion or rash.   Neurological:      General: No focal deficit present.      Mental Status: She is alert and oriented to person, place, and time.      Cranial Nerves: No cranial nerve deficit.      Sensory: No sensory deficit.      Motor: Motor function is intact.      Coordination: Coordination is intact.      Gait: Gait normal.      Deep Tendon Reflexes: Reflexes are normal and symmetric.   Psychiatric:         Attention and Perception: She is attentive.         Mood and Affect: Mood is anxious. Affect is tearful.         Speech: Speech is rapid and pressured.         Behavior: Behavior normal.         Thought Content: Thought content normal.           Data Reviewed:          Negative Hep panel      K 3.3  TSH 4.18  WBC 3.2          Assessment and Plan:       Encounter for wellness examination in adult         Moderate episode of recurrent major depressive disorder           Generalized anxiety disorder           Psychophysiological insomnia           Alcohol abuse      Orders:    Ferritin; Future    Folate; Future    Vitamin B12; Future    Iron Profile w/o Ferritin; Future    Transaminitis  - counseled re avoidance of other hepatotoxic substances    Orders:    Comprehensive Metabolic Panel; Future    Ferritin; Future    Folate; Future    Vitamin B12; Future    Iron Profile w/o Ferritin; Future    Leukopenia, unspecified type    Orders:    CBC & Differential; Future    Ferritin; Future    Folate; Future    Vitamin B12; Future    Iron Profile w/o Ferritin;  Future    Thrombocytopenia    Orders:    CBC & Differential; Future    Ferritin; Future    Folate; Future    Vitamin B12; Future    Iron Profile w/o Ferritin; Future        Preventative counseling completed including relevant screenings, appropriate vaccinations, healthy nutrition, and appropriate physical activity. Age-appropriate Screening & Interventions recommended by USPSTF were reviewed with the patient, and Health Maintenance was updated in Epic.  BMI is within normal parameters. No other follow-up for BMI required.            Medications, including side effects, were discussed with the patient. Patient verbalized understanding.  The plan of care was discussed. All questions were answered. Patient verbalized understanding.        Return - labs one month.

## 2025-07-07 NOTE — ASSESSMENT & PLAN NOTE
{Psychiatric illness (Optional):56420}    Orders:    Ferritin; Future    Folate; Future    Vitamin B12; Future    Iron Profile w/o Ferritin; Future

## 2025-07-30 DIAGNOSIS — R74.01 TRANSAMINITIS: ICD-10-CM

## 2025-07-30 DIAGNOSIS — D69.6 THROMBOCYTOPENIA: ICD-10-CM

## 2025-07-30 DIAGNOSIS — D72.819 LEUKOPENIA, UNSPECIFIED TYPE: ICD-10-CM

## 2025-07-30 DIAGNOSIS — F10.10 ALCOHOL ABUSE: ICD-10-CM

## 2025-08-08 LAB
ALBUMIN SERPL-MCNC: 4.4 G/DL (ref 3.5–5.2)
ALBUMIN/GLOB SERPL: 2.1 G/DL
ALP SERPL-CCNC: 43 U/L (ref 39–117)
ALT SERPL-CCNC: 15 U/L (ref 1–33)
AST SERPL-CCNC: 13 U/L (ref 1–32)
BASOPHILS # BLD AUTO: 0.02 10*3/MM3 (ref 0–0.2)
BASOPHILS NFR BLD AUTO: 0.3 % (ref 0–1.5)
BILIRUB SERPL-MCNC: 0.5 MG/DL (ref 0–1.2)
BUN SERPL-MCNC: 5 MG/DL (ref 6–20)
BUN/CREAT SERPL: 7 (ref 7–25)
CALCIUM SERPL-MCNC: 9.7 MG/DL (ref 8.6–10.5)
CHLORIDE SERPL-SCNC: 104 MMOL/L (ref 98–107)
CO2 SERPL-SCNC: 21.1 MMOL/L (ref 22–29)
CREAT SERPL-MCNC: 0.71 MG/DL (ref 0.57–1)
EGFRCR SERPLBLD CKD-EPI 2021: 111.8 ML/MIN/1.73
EOSINOPHIL # BLD AUTO: 0.04 10*3/MM3 (ref 0–0.4)
EOSINOPHIL NFR BLD AUTO: 0.7 % (ref 0.3–6.2)
ERYTHROCYTE [DISTWIDTH] IN BLOOD BY AUTOMATED COUNT: 11.6 % (ref 12.3–15.4)
FERRITIN SERPL-MCNC: 200 NG/ML (ref 13–150)
FOLATE SERPL-MCNC: 11.5 NG/ML (ref 4.78–24.2)
GLOBULIN SER CALC-MCNC: 2.1 GM/DL
GLUCOSE SERPL-MCNC: 114 MG/DL (ref 65–99)
HCT VFR BLD AUTO: 39.8 % (ref 34–46.6)
HGB BLD-MCNC: 13.9 G/DL (ref 12–15.9)
IMM GRANULOCYTES # BLD AUTO: 0.02 10*3/MM3 (ref 0–0.05)
IMM GRANULOCYTES NFR BLD AUTO: 0.3 % (ref 0–0.5)
IRON SATN MFR SERPL: 29 % (ref 20–50)
IRON SERPL-MCNC: 103 MCG/DL (ref 37–145)
LYMPHOCYTES # BLD AUTO: 1.16 10*3/MM3 (ref 0.7–3.1)
LYMPHOCYTES NFR BLD AUTO: 18.9 % (ref 19.6–45.3)
MCH RBC QN AUTO: 33.4 PG (ref 26.6–33)
MCHC RBC AUTO-ENTMCNC: 34.9 G/DL (ref 31.5–35.7)
MCV RBC AUTO: 95.7 FL (ref 79–97)
MONOCYTES # BLD AUTO: 0.36 10*3/MM3 (ref 0.1–0.9)
MONOCYTES NFR BLD AUTO: 5.9 % (ref 5–12)
NEUTROPHILS # BLD AUTO: 4.54 10*3/MM3 (ref 1.7–7)
NEUTROPHILS NFR BLD AUTO: 73.9 % (ref 42.7–76)
NRBC BLD AUTO-RTO: 0 /100 WBC (ref 0–0.2)
PLATELET # BLD AUTO: 241 10*3/MM3 (ref 140–450)
POTASSIUM SERPL-SCNC: 4.3 MMOL/L (ref 3.5–5.2)
PROT SERPL-MCNC: 6.5 G/DL (ref 6–8.5)
RBC # BLD AUTO: 4.16 10*6/MM3 (ref 3.77–5.28)
SODIUM SERPL-SCNC: 137 MMOL/L (ref 136–145)
TIBC SERPL-MCNC: 361 MCG/DL
UIBC SERPL-MCNC: 258 MCG/DL (ref 112–346)
VIT B12 SERPL-MCNC: 428 PG/ML (ref 211–946)
WBC # BLD AUTO: 6.14 10*3/MM3 (ref 3.4–10.8)

## 2025-08-11 ENCOUNTER — RESULTS FOLLOW-UP (OUTPATIENT)
Dept: INTERNAL MEDICINE | Facility: CLINIC | Age: 40
End: 2025-08-11
Payer: COMMERCIAL

## 2025-08-11 DIAGNOSIS — F10.10 ALCOHOL ABUSE: Primary | ICD-10-CM

## 2025-08-11 DIAGNOSIS — D69.6 THROMBOCYTOPENIA: ICD-10-CM

## 2025-08-11 DIAGNOSIS — D72.819 LEUKOPENIA, UNSPECIFIED TYPE: ICD-10-CM

## 2025-08-11 DIAGNOSIS — R74.01 TRANSAMINITIS: ICD-10-CM

## 2025-08-26 DIAGNOSIS — R74.01 TRANSAMINITIS: ICD-10-CM

## 2025-08-26 DIAGNOSIS — D72.819 LEUKOPENIA, UNSPECIFIED TYPE: ICD-10-CM

## 2025-08-26 DIAGNOSIS — D69.6 THROMBOCYTOPENIA: ICD-10-CM

## 2025-08-26 DIAGNOSIS — F10.10 ALCOHOL ABUSE: ICD-10-CM

## (undated) DEVICE — SINGLE-USE BIOPSY FORCEPS: Brand: RADIAL JAW 4

## (undated) DEVICE — CANN O2 ETCO2 FITS ALL CONN CO2 SMPL A/ 7IN DISP LF

## (undated) DEVICE — ADAPT CLN BIOGUARD AIR/H2O DISP

## (undated) DEVICE — SENSR O2 OXIMAX FNGR A/ 18IN NONSTR

## (undated) DEVICE — TUBING, SUCTION, 1/4" X 10', STRAIGHT: Brand: MEDLINE

## (undated) DEVICE — KT ORCA ORCAPOD DISP STRL

## (undated) DEVICE — LN SMPL CO2 SHTRM SD STREAM W/M LUER